# Patient Record
Sex: MALE | Race: WHITE | NOT HISPANIC OR LATINO | URBAN - METROPOLITAN AREA
[De-identification: names, ages, dates, MRNs, and addresses within clinical notes are randomized per-mention and may not be internally consistent; named-entity substitution may affect disease eponyms.]

---

## 2019-08-01 ENCOUNTER — ANESTHESIA (INPATIENT)
Dept: PERIOP | Facility: HOSPITAL | Age: 29
DRG: 912 | End: 2019-08-01
Payer: COMMERCIAL

## 2019-08-01 ENCOUNTER — HOSPITAL ENCOUNTER (INPATIENT)
Facility: HOSPITAL | Age: 29
LOS: 10 days | Discharge: HOME | DRG: 912 | End: 2019-08-11
Attending: EMERGENCY MEDICINE | Admitting: EMERGENCY MEDICINE
Payer: COMMERCIAL

## 2019-08-01 ENCOUNTER — ANESTHESIA EVENT (INPATIENT)
Dept: PERIOP | Facility: HOSPITAL | Age: 29
DRG: 912 | End: 2019-08-01
Payer: COMMERCIAL

## 2019-08-01 ENCOUNTER — APPOINTMENT (EMERGENCY)
Dept: RADIOLOGY | Facility: HOSPITAL | Age: 29
DRG: 912 | End: 2019-08-01
Payer: COMMERCIAL

## 2019-08-01 ENCOUNTER — APPOINTMENT (INPATIENT)
Dept: RADIOLOGY | Facility: HOSPITAL | Age: 29
DRG: 912 | End: 2019-08-01
Attending: EMERGENCY MEDICINE
Payer: COMMERCIAL

## 2019-08-01 DIAGNOSIS — T07.XXXA MULTIPLE TRAUMA: Primary | ICD-10-CM

## 2019-08-01 DIAGNOSIS — S32.402A LEFT ACETABULAR FRACTURE (HCC): ICD-10-CM

## 2019-08-01 DIAGNOSIS — S32.302A: ICD-10-CM

## 2019-08-01 PROBLEM — J98.4 PULMONARY INSUFFICIENCY: Status: ACTIVE | Noted: 2019-08-01

## 2019-08-01 PROBLEM — S81.812A LACERATION OF LEFT LEG: Status: ACTIVE | Noted: 2019-08-01

## 2019-08-01 PROBLEM — S71.112A LACERATION OF LEFT THIGH: Status: ACTIVE | Noted: 2019-08-01

## 2019-08-01 PROBLEM — S36.039A SPLENIC LACERATION: Status: ACTIVE | Noted: 2019-08-01

## 2019-08-01 PROBLEM — S27.329A PULMONARY CONTUSION: Status: ACTIVE | Noted: 2019-08-01

## 2019-08-01 PROBLEM — S22.32XA FRACTURE OF RIB OF LEFT SIDE: Status: ACTIVE | Noted: 2019-08-01

## 2019-08-01 LAB
ABO GROUP BLD: NORMAL
ALBUMIN SERPL BCP-MCNC: 4.1 G/DL (ref 3.5–5)
ALP SERPL-CCNC: 27 U/L (ref 46–116)
ALT SERPL W P-5'-P-CCNC: 73 U/L (ref 12–78)
ANION GAP SERPL CALCULATED.3IONS-SCNC: 8 MMOL/L (ref 4–13)
APTT PPP: 30 SECONDS (ref 23–37)
APTT PPP: 32 SECONDS (ref 23–37)
AST SERPL W P-5'-P-CCNC: 62 U/L (ref 5–45)
BASE EXCESS BLDA CALC-SCNC: -1.6 MMOL/L
BASE EXCESS BLDA CALC-SCNC: -1.6 MMOL/L
BASE EXCESS BLDA CALC-SCNC: 1 MMOL/L (ref -2–3)
BASOPHILS # BLD AUTO: 0.06 THOUSANDS/ΜL (ref 0–0.1)
BASOPHILS NFR BLD AUTO: 1 % (ref 0–1)
BILIRUB SERPL-MCNC: 0.29 MG/DL (ref 0.2–1)
BLD GP AB SCN SERPL QL: NEGATIVE
BUN SERPL-MCNC: 9 MG/DL (ref 5–25)
CA-I BLD-SCNC: 1.15 MMOL/L (ref 1.12–1.32)
CALCIUM SERPL-MCNC: 8.9 MG/DL (ref 8.3–10.1)
CHLORIDE SERPL-SCNC: 103 MMOL/L (ref 100–108)
CO2 SERPL-SCNC: 27 MMOL/L (ref 21–32)
CREAT SERPL-MCNC: 1.23 MG/DL (ref 0.6–1.3)
EOSINOPHIL # BLD AUTO: 0.09 THOUSAND/ΜL (ref 0–0.61)
EOSINOPHIL NFR BLD AUTO: 1 % (ref 0–6)
ERYTHROCYTE [DISTWIDTH] IN BLOOD BY AUTOMATED COUNT: 12.7 % (ref 11.6–15.1)
ERYTHROCYTE [DISTWIDTH] IN BLOOD BY AUTOMATED COUNT: 12.8 % (ref 11.6–15.1)
FIBRINOGEN PPP-MCNC: 170 MG/DL (ref 227–495)
GFR SERPL CREATININE-BSD FRML MDRD: 78 ML/MIN/1.73SQ M
GLUCOSE SERPL-MCNC: 167 MG/DL (ref 65–140)
GLUCOSE SERPL-MCNC: 172 MG/DL (ref 65–140)
HCO3 BLDA-SCNC: 25.2 MMOL/L (ref 22–28)
HCO3 BLDA-SCNC: 25.4 MMOL/L (ref 22–28)
HCO3 BLDA-SCNC: 26.8 MMOL/L (ref 24–30)
HCT VFR BLD AUTO: 29.7 % (ref 36.5–49.3)
HCT VFR BLD AUTO: 29.9 % (ref 36.5–49.3)
HCT VFR BLD AUTO: 30.7 % (ref 36.5–49.3)
HCT VFR BLD AUTO: 40.3 % (ref 36.5–49.3)
HCT VFR BLD CALC: 32 % (ref 36.5–49.3)
HGB BLD-MCNC: 10.2 G/DL (ref 12–17)
HGB BLD-MCNC: 10.3 G/DL (ref 12–17)
HGB BLD-MCNC: 13.2 G/DL (ref 12–17)
HGB BLD-MCNC: 9.8 G/DL (ref 12–17)
HGB BLDA-MCNC: 10.9 G/DL (ref 12–17)
IMM GRANULOCYTES # BLD AUTO: 0.18 THOUSAND/UL (ref 0–0.2)
IMM GRANULOCYTES NFR BLD AUTO: 2 % (ref 0–2)
INR PPP: 1.19 (ref 0.84–1.19)
INR PPP: 1.46 (ref 0.84–1.19)
LACTATE SERPL-SCNC: 0.6 MMOL/L (ref 0.5–2)
LYMPHOCYTES # BLD AUTO: 3.05 THOUSANDS/ΜL (ref 0.6–4.47)
LYMPHOCYTES NFR BLD AUTO: 36 % (ref 14–44)
MCH RBC QN AUTO: 29.9 PG (ref 26.8–34.3)
MCH RBC QN AUTO: 30 PG (ref 26.8–34.3)
MCHC RBC AUTO-ENTMCNC: 32.8 G/DL (ref 31.4–37.4)
MCHC RBC AUTO-ENTMCNC: 33 G/DL (ref 31.4–37.4)
MCV RBC AUTO: 91 FL (ref 82–98)
MCV RBC AUTO: 91 FL (ref 82–98)
MONOCYTES # BLD AUTO: 0.67 THOUSAND/ΜL (ref 0.17–1.22)
MONOCYTES NFR BLD AUTO: 8 % (ref 4–12)
NASAL CANNULA: 3
NEUTROPHILS # BLD AUTO: 4.49 THOUSANDS/ΜL (ref 1.85–7.62)
NEUTS SEG NFR BLD AUTO: 52 % (ref 43–75)
NRBC BLD AUTO-RTO: 0 /100 WBCS
O2 CT BLDA-SCNC: 14.6 ML/DL (ref 16–23)
O2 CT BLDA-SCNC: 14.6 ML/DL (ref 16–23)
OXYHGB MFR BLDA: 94.3 % (ref 94–97)
OXYHGB MFR BLDA: 95.2 % (ref 94–97)
PCO2 BLD: 28 MMOL/L (ref 21–32)
PCO2 BLD: 47.9 MM HG (ref 42–50)
PCO2 BLDA: 52 MM HG (ref 36–44)
PCO2 BLDA: 54 MM HG (ref 36–44)
PH BLD: 7.36 [PH] (ref 7.3–7.4)
PH BLDA: 7.29 [PH] (ref 7.35–7.45)
PH BLDA: 7.3 [PH] (ref 7.35–7.45)
PLATELET # BLD AUTO: 135 THOUSANDS/UL (ref 149–390)
PLATELET # BLD AUTO: 80 THOUSANDS/UL (ref 149–390)
PMV BLD AUTO: 10 FL (ref 8.9–12.7)
PMV BLD AUTO: 10.2 FL (ref 8.9–12.7)
PO2 BLD: 59 MM HG (ref 35–45)
PO2 BLDA: 102.3 MM HG (ref 75–129)
PO2 BLDA: 111.2 MM HG (ref 75–129)
POTASSIUM BLD-SCNC: 3.4 MMOL/L (ref 3.5–5.3)
POTASSIUM SERPL-SCNC: 3.2 MMOL/L (ref 3.5–5.3)
PROT SERPL-MCNC: 7.2 G/DL (ref 6.4–8.2)
PROTHROMBIN TIME: 14.7 SECONDS (ref 11.6–14.5)
PROTHROMBIN TIME: 17.3 SECONDS (ref 11.6–14.5)
RBC # BLD AUTO: 3.27 MILLION/UL (ref 3.88–5.62)
RBC # BLD AUTO: 4.41 MILLION/UL (ref 3.88–5.62)
RH BLD: POSITIVE
SAO2 % BLD FROM PO2: 89 % (ref 95–98)
SODIUM BLD-SCNC: 139 MMOL/L (ref 136–145)
SODIUM SERPL-SCNC: 138 MMOL/L (ref 136–145)
SPECIMEN EXPIRATION DATE: NORMAL
SPECIMEN SOURCE: ABNORMAL
WBC # BLD AUTO: 10.7 THOUSAND/UL (ref 4.31–10.16)
WBC # BLD AUTO: 8.54 THOUSAND/UL (ref 4.31–10.16)

## 2019-08-01 PROCEDURE — 86900 BLOOD TYPING SEROLOGIC ABO: CPT | Performed by: EMERGENCY MEDICINE

## 2019-08-01 PROCEDURE — 85014 HEMATOCRIT: CPT | Performed by: EMERGENCY MEDICINE

## 2019-08-01 PROCEDURE — 84132 ASSAY OF SERUM POTASSIUM: CPT

## 2019-08-01 PROCEDURE — 74177 CT ABD & PELVIS W/CONTRAST: CPT

## 2019-08-01 PROCEDURE — C1766 INTRO/SHEATH,STRBLE,NON-PEEL: HCPCS

## 2019-08-01 PROCEDURE — 37244 VASC EMBOLIZE/OCCLUDE BLEED: CPT | Performed by: STUDENT IN AN ORGANIZED HEALTH CARE EDUCATION/TRAINING PROGRAM

## 2019-08-01 PROCEDURE — 99221 1ST HOSP IP/OBS SF/LOW 40: CPT | Performed by: ORTHOPAEDIC SURGERY

## 2019-08-01 PROCEDURE — 04V43DZ RESTRICTION OF SPLENIC ARTERY WITH INTRALUMINAL DEVICE, PERCUTANEOUS APPROACH: ICD-10-PCS | Performed by: STUDENT IN AN ORGANIZED HEALTH CARE EDUCATION/TRAINING PROGRAM

## 2019-08-01 PROCEDURE — 86901 BLOOD TYPING SEROLOGIC RH(D): CPT | Performed by: EMERGENCY MEDICINE

## 2019-08-01 PROCEDURE — 86920 COMPATIBILITY TEST SPIN: CPT

## 2019-08-01 PROCEDURE — 85027 COMPLETE CBC AUTOMATED: CPT | Performed by: PHYSICIAN ASSISTANT

## 2019-08-01 PROCEDURE — 99153 MOD SED SAME PHYS/QHP EA: CPT

## 2019-08-01 PROCEDURE — 99285 EMERGENCY DEPT VISIT HI MDM: CPT

## 2019-08-01 PROCEDURE — C1769 GUIDE WIRE: HCPCS

## 2019-08-01 PROCEDURE — NC001 PR NO CHARGE: Performed by: STUDENT IN AN ORGANIZED HEALTH CARE EDUCATION/TRAINING PROGRAM

## 2019-08-01 PROCEDURE — 2W1PX6Z COMPRESSION OF LEFT UPPER LEG USING PRESSURE DRESSING: ICD-10-PCS | Performed by: SURGERY

## 2019-08-01 PROCEDURE — 85018 HEMOGLOBIN: CPT | Performed by: EMERGENCY MEDICINE

## 2019-08-01 PROCEDURE — 75710 ARTERY X-RAYS ARM/LEG: CPT | Performed by: STUDENT IN AN ORGANIZED HEALTH CARE EDUCATION/TRAINING PROGRAM

## 2019-08-01 PROCEDURE — 84295 ASSAY OF SERUM SODIUM: CPT

## 2019-08-01 PROCEDURE — 85014 HEMATOCRIT: CPT | Performed by: SURGERY

## 2019-08-01 PROCEDURE — 97606 NEG PRS WND THER DME>50 SQCM: CPT | Performed by: SURGERY

## 2019-08-01 PROCEDURE — 85018 HEMOGLOBIN: CPT | Performed by: SURGERY

## 2019-08-01 PROCEDURE — B413ZZZ FLUOROSCOPY OF SPLENIC ARTERIES: ICD-10-PCS | Performed by: STUDENT IN AN ORGANIZED HEALTH CARE EDUCATION/TRAINING PROGRAM

## 2019-08-01 PROCEDURE — 90471 IMMUNIZATION ADMIN: CPT

## 2019-08-01 PROCEDURE — 90715 TDAP VACCINE 7 YRS/> IM: CPT | Performed by: PHYSICIAN ASSISTANT

## 2019-08-01 PROCEDURE — 12004 RPR S/N/AX/GEN/TRK7.6-12.5CM: CPT | Performed by: SURGERY

## 2019-08-01 PROCEDURE — 36247 INS CATH ABD/L-EXT ART 3RD: CPT | Performed by: STUDENT IN AN ORGANIZED HEALTH CARE EDUCATION/TRAINING PROGRAM

## 2019-08-01 PROCEDURE — P9016 RBC LEUKOCYTES REDUCED: HCPCS

## 2019-08-01 PROCEDURE — 37243 VASC EMBOLIZE/OCCLUDE ORGAN: CPT

## 2019-08-01 PROCEDURE — 36248 INS CATH ABD/L-EXT ART ADDL: CPT | Performed by: STUDENT IN AN ORGANIZED HEALTH CARE EDUCATION/TRAINING PROGRAM

## 2019-08-01 PROCEDURE — 0JDM0ZZ EXTRACTION OF LEFT UPPER LEG SUBCUTANEOUS TISSUE AND FASCIA, OPEN APPROACH: ICD-10-PCS | Performed by: SURGERY

## 2019-08-01 PROCEDURE — 3E0U3KZ INTRODUCTION OF OTHER DIAGNOSTIC SUBSTANCE INTO JOINTS, PERCUTANEOUS APPROACH: ICD-10-PCS | Performed by: ORTHOPAEDIC SURGERY

## 2019-08-01 PROCEDURE — 85610 PROTHROMBIN TIME: CPT | Performed by: EMERGENCY MEDICINE

## 2019-08-01 PROCEDURE — 86850 RBC ANTIBODY SCREEN: CPT | Performed by: EMERGENCY MEDICINE

## 2019-08-01 PROCEDURE — 99291 CRITICAL CARE FIRST HOUR: CPT | Performed by: EMERGENCY MEDICINE

## 2019-08-01 PROCEDURE — NC001 PR NO CHARGE: Performed by: SURGERY

## 2019-08-01 PROCEDURE — 75635 CT ANGIO ABDOMINAL ARTERIES: CPT

## 2019-08-01 PROCEDURE — 83605 ASSAY OF LACTIC ACID: CPT | Performed by: PHYSICIAN ASSISTANT

## 2019-08-01 PROCEDURE — 80053 COMPREHEN METABOLIC PANEL: CPT | Performed by: EMERGENCY MEDICINE

## 2019-08-01 PROCEDURE — 0JQM0ZZ REPAIR LEFT UPPER LEG SUBCUTANEOUS TISSUE AND FASCIA, OPEN APPROACH: ICD-10-PCS | Performed by: SURGERY

## 2019-08-01 PROCEDURE — 85730 THROMBOPLASTIN TIME PARTIAL: CPT | Performed by: STUDENT IN AN ORGANIZED HEALTH CARE EDUCATION/TRAINING PROGRAM

## 2019-08-01 PROCEDURE — 99152 MOD SED SAME PHYS/QHP 5/>YRS: CPT | Performed by: STUDENT IN AN ORGANIZED HEALTH CARE EDUCATION/TRAINING PROGRAM

## 2019-08-01 PROCEDURE — C1887 CATHETER, GUIDING: HCPCS

## 2019-08-01 PROCEDURE — 85025 COMPLETE CBC W/AUTO DIFF WBC: CPT | Performed by: EMERGENCY MEDICINE

## 2019-08-01 PROCEDURE — B41GZZZ FLUOROSCOPY OF LEFT LOWER EXTREMITY ARTERIES: ICD-10-PCS | Performed by: STUDENT IN AN ORGANIZED HEALTH CARE EDUCATION/TRAINING PROGRAM

## 2019-08-01 PROCEDURE — 82805 BLOOD GASES W/O2 SATURATION: CPT | Performed by: PHYSICIAN ASSISTANT

## 2019-08-01 PROCEDURE — 82803 BLOOD GASES ANY COMBINATION: CPT

## 2019-08-01 PROCEDURE — C1894 INTRO/SHEATH, NON-LASER: HCPCS

## 2019-08-01 PROCEDURE — 85610 PROTHROMBIN TIME: CPT | Performed by: STUDENT IN AN ORGANIZED HEALTH CARE EDUCATION/TRAINING PROGRAM

## 2019-08-01 PROCEDURE — 96374 THER/PROPH/DIAG INJ IV PUSH: CPT

## 2019-08-01 PROCEDURE — 70450 CT HEAD/BRAIN W/O DYE: CPT

## 2019-08-01 PROCEDURE — 82947 ASSAY GLUCOSE BLOOD QUANT: CPT

## 2019-08-01 PROCEDURE — 71260 CT THORAX DX C+: CPT

## 2019-08-01 PROCEDURE — 27508 TREATMENT OF THIGH FRACTURE: CPT | Performed by: ORTHOPAEDIC SURGERY

## 2019-08-01 PROCEDURE — 85014 HEMATOCRIT: CPT

## 2019-08-01 PROCEDURE — 20610 DRAIN/INJ JOINT/BURSA W/O US: CPT | Performed by: ORTHOPAEDIC SURGERY

## 2019-08-01 PROCEDURE — 36430 TRANSFUSION BLD/BLD COMPNT: CPT

## 2019-08-01 PROCEDURE — 85730 THROMBOPLASTIN TIME PARTIAL: CPT | Performed by: EMERGENCY MEDICINE

## 2019-08-01 PROCEDURE — 72125 CT NECK SPINE W/O DYE: CPT

## 2019-08-01 PROCEDURE — 99152 MOD SED SAME PHYS/QHP 5/>YRS: CPT

## 2019-08-01 PROCEDURE — 30233N1 TRANSFUSION OF NONAUTOLOGOUS RED BLOOD CELLS INTO PERIPHERAL VEIN, PERCUTANEOUS APPROACH: ICD-10-PCS | Performed by: EMERGENCY MEDICINE

## 2019-08-01 PROCEDURE — 85384 FIBRINOGEN ACTIVITY: CPT | Performed by: STUDENT IN AN ORGANIZED HEALTH CARE EDUCATION/TRAINING PROGRAM

## 2019-08-01 PROCEDURE — 99292 CRITICAL CARE ADDL 30 MIN: CPT | Performed by: SURGERY

## 2019-08-01 PROCEDURE — C1760 CLOSURE DEV, VASC: HCPCS

## 2019-08-01 PROCEDURE — 82330 ASSAY OF CALCIUM: CPT

## 2019-08-01 PROCEDURE — 75898 FOLLOW-UP ANGIOGRAPHY: CPT

## 2019-08-01 PROCEDURE — 36415 COLL VENOUS BLD VENIPUNCTURE: CPT | Performed by: EMERGENCY MEDICINE

## 2019-08-01 PROCEDURE — 82805 BLOOD GASES W/O2 SATURATION: CPT | Performed by: STUDENT IN AN ORGANIZED HEALTH CARE EDUCATION/TRAINING PROGRAM

## 2019-08-01 PROCEDURE — 75726 ARTERY X-RAYS ABDOMEN: CPT

## 2019-08-01 PROCEDURE — NC001 PR NO CHARGE: Performed by: EMERGENCY MEDICINE

## 2019-08-01 RX ORDER — SODIUM CHLORIDE 9 MG/ML
INJECTION, SOLUTION INTRAVENOUS AS NEEDED
Status: DISCONTINUED | OUTPATIENT
Start: 2019-08-01 | End: 2019-08-01 | Stop reason: HOSPADM

## 2019-08-01 RX ORDER — NEOSTIGMINE METHYLSULFATE 1 MG/ML
INJECTION INTRAVENOUS AS NEEDED
Status: DISCONTINUED | OUTPATIENT
Start: 2019-08-01 | End: 2019-08-01 | Stop reason: SURG

## 2019-08-01 RX ORDER — CEFAZOLIN SODIUM 2 G/50ML
2000 SOLUTION INTRAVENOUS ONCE
Status: DISCONTINUED | OUTPATIENT
Start: 2019-08-01 | End: 2019-08-01

## 2019-08-01 RX ORDER — ONDANSETRON 2 MG/ML
INJECTION INTRAMUSCULAR; INTRAVENOUS AS NEEDED
Status: DISCONTINUED | OUTPATIENT
Start: 2019-08-01 | End: 2019-08-01 | Stop reason: SURG

## 2019-08-01 RX ORDER — METOCLOPRAMIDE HYDROCHLORIDE 5 MG/ML
10 INJECTION INTRAMUSCULAR; INTRAVENOUS ONCE AS NEEDED
Status: DISCONTINUED | OUTPATIENT
Start: 2019-08-01 | End: 2019-08-01 | Stop reason: HOSPADM

## 2019-08-01 RX ORDER — METHOCARBAMOL 750 MG/1
750 TABLET, FILM COATED ORAL EVERY 6 HOURS SCHEDULED
Status: DISCONTINUED | OUTPATIENT
Start: 2019-08-01 | End: 2019-08-11 | Stop reason: HOSPADM

## 2019-08-01 RX ORDER — OXYCODONE HYDROCHLORIDE 10 MG/1
10 TABLET ORAL EVERY 4 HOURS PRN
Status: DISCONTINUED | OUTPATIENT
Start: 2019-08-01 | End: 2019-08-02

## 2019-08-01 RX ORDER — DOCUSATE SODIUM 100 MG/1
100 CAPSULE, LIQUID FILLED ORAL 2 TIMES DAILY
Status: DISCONTINUED | OUTPATIENT
Start: 2019-08-01 | End: 2019-08-11 | Stop reason: HOSPADM

## 2019-08-01 RX ORDER — POTASSIUM CHLORIDE 14.9 MG/ML
20 INJECTION INTRAVENOUS ONCE
Status: COMPLETED | OUTPATIENT
Start: 2019-08-01 | End: 2019-08-01

## 2019-08-01 RX ORDER — HYDROMORPHONE HCL/PF 1 MG/ML
0.5 SYRINGE (ML) INJECTION
Status: DISCONTINUED | OUTPATIENT
Start: 2019-08-01 | End: 2019-08-01 | Stop reason: HOSPADM

## 2019-08-01 RX ORDER — KETAMINE HYDROCHLORIDE 50 MG/ML
INJECTION, SOLUTION, CONCENTRATE INTRAMUSCULAR; INTRAVENOUS AS NEEDED
Status: DISCONTINUED | OUTPATIENT
Start: 2019-08-01 | End: 2019-08-01 | Stop reason: SURG

## 2019-08-01 RX ORDER — ONDANSETRON 2 MG/ML
4 INJECTION INTRAMUSCULAR; INTRAVENOUS EVERY 4 HOURS PRN
Status: DISCONTINUED | OUTPATIENT
Start: 2019-08-01 | End: 2019-08-11 | Stop reason: HOSPADM

## 2019-08-01 RX ORDER — SUCCINYLCHOLINE/SOD CL,ISO/PF 100 MG/5ML
SYRINGE (ML) INTRAVENOUS AS NEEDED
Status: DISCONTINUED | OUTPATIENT
Start: 2019-08-01 | End: 2019-08-01 | Stop reason: SURG

## 2019-08-01 RX ORDER — MIDAZOLAM HYDROCHLORIDE 1 MG/ML
INJECTION INTRAMUSCULAR; INTRAVENOUS CODE/TRAUMA/SEDATION MEDICATION
Status: COMPLETED | OUTPATIENT
Start: 2019-08-01 | End: 2019-08-01

## 2019-08-01 RX ORDER — FENTANYL CITRATE/PF 50 MCG/ML
50 SYRINGE (ML) INJECTION
Status: DISCONTINUED | OUTPATIENT
Start: 2019-08-01 | End: 2019-08-01 | Stop reason: HOSPADM

## 2019-08-01 RX ORDER — SENNOSIDES 8.6 MG
2 TABLET ORAL
Status: DISCONTINUED | OUTPATIENT
Start: 2019-08-01 | End: 2019-08-11 | Stop reason: HOSPADM

## 2019-08-01 RX ORDER — CEFAZOLIN SODIUM 2 G/50ML
2000 SOLUTION INTRAVENOUS
Status: COMPLETED | OUTPATIENT
Start: 2019-08-02 | End: 2019-08-01

## 2019-08-01 RX ORDER — FENTANYL CITRATE 50 UG/ML
INJECTION, SOLUTION INTRAMUSCULAR; INTRAVENOUS CODE/TRAUMA/SEDATION MEDICATION
Status: COMPLETED | OUTPATIENT
Start: 2019-08-01 | End: 2019-08-01

## 2019-08-01 RX ORDER — SODIUM CHLORIDE 9 MG/ML
INJECTION, SOLUTION INTRAVENOUS CONTINUOUS PRN
Status: DISCONTINUED | OUTPATIENT
Start: 2019-08-01 | End: 2019-08-01 | Stop reason: SURG

## 2019-08-01 RX ORDER — CHLORHEXIDINE GLUCONATE 0.12 MG/ML
15 RINSE ORAL EVERY 12 HOURS SCHEDULED
Status: DISCONTINUED | OUTPATIENT
Start: 2019-08-01 | End: 2019-08-02

## 2019-08-01 RX ORDER — ACETAMINOPHEN 325 MG/1
650 TABLET ORAL EVERY 6 HOURS SCHEDULED
Status: DISCONTINUED | OUTPATIENT
Start: 2019-08-01 | End: 2019-08-02

## 2019-08-01 RX ORDER — GLYCOPYRROLATE 0.2 MG/ML
INJECTION INTRAMUSCULAR; INTRAVENOUS AS NEEDED
Status: DISCONTINUED | OUTPATIENT
Start: 2019-08-01 | End: 2019-08-01 | Stop reason: SURG

## 2019-08-01 RX ORDER — ROCURONIUM BROMIDE 10 MG/ML
INJECTION, SOLUTION INTRAVENOUS AS NEEDED
Status: DISCONTINUED | OUTPATIENT
Start: 2019-08-01 | End: 2019-08-01 | Stop reason: SURG

## 2019-08-01 RX ORDER — ONDANSETRON 2 MG/ML
4 INJECTION INTRAMUSCULAR; INTRAVENOUS ONCE
Status: COMPLETED | OUTPATIENT
Start: 2019-08-01 | End: 2019-08-01

## 2019-08-01 RX ORDER — MAGNESIUM HYDROXIDE 1200 MG/15ML
LIQUID ORAL AS NEEDED
Status: DISCONTINUED | OUTPATIENT
Start: 2019-08-01 | End: 2019-08-01 | Stop reason: HOSPADM

## 2019-08-01 RX ORDER — LIDOCAINE 50 MG/G
1 PATCH TOPICAL DAILY
Status: DISCONTINUED | OUTPATIENT
Start: 2019-08-02 | End: 2019-08-11 | Stop reason: HOSPADM

## 2019-08-01 RX ORDER — PANTOPRAZOLE SODIUM 40 MG/1
40 INJECTION, POWDER, FOR SOLUTION INTRAVENOUS
Status: DISCONTINUED | OUTPATIENT
Start: 2019-08-02 | End: 2019-08-02

## 2019-08-01 RX ORDER — PROPOFOL 10 MG/ML
INJECTION, EMULSION INTRAVENOUS AS NEEDED
Status: DISCONTINUED | OUTPATIENT
Start: 2019-08-01 | End: 2019-08-01 | Stop reason: SURG

## 2019-08-01 RX ORDER — OXYCODONE HYDROCHLORIDE 5 MG/1
5 TABLET ORAL EVERY 4 HOURS PRN
Status: DISCONTINUED | OUTPATIENT
Start: 2019-08-01 | End: 2019-08-02

## 2019-08-01 RX ORDER — HYDROMORPHONE HCL/PF 1 MG/ML
0.5 SYRINGE (ML) INJECTION
Status: DISCONTINUED | OUTPATIENT
Start: 2019-08-01 | End: 2019-08-02

## 2019-08-01 RX ORDER — CEFAZOLIN SODIUM 1 G/3ML
INJECTION, POWDER, FOR SOLUTION INTRAMUSCULAR; INTRAVENOUS CODE/TRAUMA/SEDATION MEDICATION
Status: COMPLETED | OUTPATIENT
Start: 2019-08-01 | End: 2019-08-01

## 2019-08-01 RX ORDER — SODIUM CHLORIDE, SODIUM GLUCONATE, SODIUM ACETATE, POTASSIUM CHLORIDE, MAGNESIUM CHLORIDE, SODIUM PHOSPHATE, DIBASIC, AND POTASSIUM PHOSPHATE .53; .5; .37; .037; .03; .012; .00082 G/100ML; G/100ML; G/100ML; G/100ML; G/100ML; G/100ML; G/100ML
100 INJECTION, SOLUTION INTRAVENOUS CONTINUOUS
Status: DISCONTINUED | OUTPATIENT
Start: 2019-08-01 | End: 2019-08-02

## 2019-08-01 RX ADMIN — FENTANYL CITRATE 50 MCG: 50 INJECTION, SOLUTION INTRAMUSCULAR; INTRAVENOUS at 14:06

## 2019-08-01 RX ADMIN — FENTANYL CITRATE 25 MCG: 50 INJECTION, SOLUTION INTRAMUSCULAR; INTRAVENOUS at 17:21

## 2019-08-01 RX ADMIN — FENTANYL CITRATE 50 MCG: 50 INJECTION, SOLUTION INTRAMUSCULAR; INTRAVENOUS at 17:51

## 2019-08-01 RX ADMIN — MIDAZOLAM 1 MG: 1 INJECTION INTRAMUSCULAR; INTRAVENOUS at 18:18

## 2019-08-01 RX ADMIN — SODIUM CHLORIDE: 0.9 INJECTION, SOLUTION INTRAVENOUS at 20:29

## 2019-08-01 RX ADMIN — FENTANYL CITRATE 50 MCG: 50 INJECTION, SOLUTION INTRAMUSCULAR; INTRAVENOUS at 15:57

## 2019-08-01 RX ADMIN — KETAMINE HYDROCHLORIDE 25 MG: 50 INJECTION, SOLUTION INTRAMUSCULAR; INTRAVENOUS at 20:41

## 2019-08-01 RX ADMIN — MIDAZOLAM 1 MG: 1 INJECTION INTRAMUSCULAR; INTRAVENOUS at 16:55

## 2019-08-01 RX ADMIN — FENTANYL CITRATE 50 MCG: 50 INJECTION, SOLUTION INTRAMUSCULAR; INTRAVENOUS at 18:27

## 2019-08-01 RX ADMIN — FENTANYL CITRATE 50 MCG: 50 INJECTION, SOLUTION INTRAMUSCULAR; INTRAVENOUS at 18:12

## 2019-08-01 RX ADMIN — FENTANYL CITRATE 25 MCG: 50 INJECTION, SOLUTION INTRAMUSCULAR; INTRAVENOUS at 17:28

## 2019-08-01 RX ADMIN — ROCURONIUM BROMIDE 20 MG: 10 INJECTION INTRAVENOUS at 21:10

## 2019-08-01 RX ADMIN — FENTANYL CITRATE 50 MCG: 50 INJECTION, SOLUTION INTRAMUSCULAR; INTRAVENOUS at 15:32

## 2019-08-01 RX ADMIN — POTASSIUM CHLORIDE 20 MEQ: 200 INJECTION, SOLUTION INTRAVENOUS at 15:35

## 2019-08-01 RX ADMIN — PROPOFOL 200 MG: 10 INJECTION, EMULSION INTRAVENOUS at 20:35

## 2019-08-01 RX ADMIN — MIDAZOLAM 1 MG: 1 INJECTION INTRAMUSCULAR; INTRAVENOUS at 15:32

## 2019-08-01 RX ADMIN — GLYCOPYRROLATE 0.7 MG: 0.2 INJECTION, SOLUTION INTRAMUSCULAR; INTRAVENOUS at 22:01

## 2019-08-01 RX ADMIN — FENTANYL CITRATE 25 MCG: 50 INJECTION, SOLUTION INTRAMUSCULAR; INTRAVENOUS at 17:01

## 2019-08-01 RX ADMIN — FENTANYL CITRATE 50 MCG: 50 INJECTION, SOLUTION INTRAMUSCULAR; INTRAVENOUS at 17:56

## 2019-08-01 RX ADMIN — MIDAZOLAM 1 MG: 1 INJECTION INTRAMUSCULAR; INTRAVENOUS at 15:26

## 2019-08-01 RX ADMIN — SODIUM CHLORIDE, SODIUM GLUCONATE, SODIUM ACETATE, POTASSIUM CHLORIDE, MAGNESIUM CHLORIDE, SODIUM PHOSPHATE, DIBASIC, AND POTASSIUM PHOSPHATE 125 ML/HR: .53; .5; .37; .037; .03; .012; .00082 INJECTION, SOLUTION INTRAVENOUS at 22:38

## 2019-08-01 RX ADMIN — MIDAZOLAM 0.5 MG: 1 INJECTION INTRAMUSCULAR; INTRAVENOUS at 15:56

## 2019-08-01 RX ADMIN — FENTANYL CITRATE 50 MCG: 50 INJECTION, SOLUTION INTRAMUSCULAR; INTRAVENOUS at 17:36

## 2019-08-01 RX ADMIN — KETAMINE HYDROCHLORIDE 25 MG: 50 INJECTION, SOLUTION INTRAMUSCULAR; INTRAVENOUS at 20:32

## 2019-08-01 RX ADMIN — FENTANYL CITRATE 25 MCG: 50 INJECTION, SOLUTION INTRAMUSCULAR; INTRAVENOUS at 16:13

## 2019-08-01 RX ADMIN — TETANUS TOXOID, REDUCED DIPHTHERIA TOXOID AND ACELLULAR PERTUSSIS VACCINE, ADSORBED 0.5 ML: 5; 2.5; 8; 8; 2.5 SUSPENSION INTRAMUSCULAR at 14:38

## 2019-08-01 RX ADMIN — ONDANSETRON 4 MG: 2 INJECTION INTRAMUSCULAR; INTRAVENOUS at 23:53

## 2019-08-01 RX ADMIN — Medication 100 MG: at 20:35

## 2019-08-01 RX ADMIN — MIDAZOLAM 0.5 MG: 1 INJECTION INTRAMUSCULAR; INTRAVENOUS at 16:10

## 2019-08-01 RX ADMIN — MIDAZOLAM 0.5 MG: 1 INJECTION INTRAMUSCULAR; INTRAVENOUS at 17:28

## 2019-08-01 RX ADMIN — PHENYLEPHRINE HYDROCHLORIDE 100 MCG: 10 INJECTION INTRAVENOUS at 21:25

## 2019-08-01 RX ADMIN — MIDAZOLAM 1 MG: 1 INJECTION INTRAMUSCULAR; INTRAVENOUS at 17:58

## 2019-08-01 RX ADMIN — MIDAZOLAM 1.5 MG: 1 INJECTION INTRAMUSCULAR; INTRAVENOUS at 17:39

## 2019-08-01 RX ADMIN — MIDAZOLAM 0.5 MG: 1 INJECTION INTRAMUSCULAR; INTRAVENOUS at 17:10

## 2019-08-01 RX ADMIN — FENTANYL CITRATE 25 MCG: 50 INJECTION, SOLUTION INTRAMUSCULAR; INTRAVENOUS at 18:41

## 2019-08-01 RX ADMIN — MIDAZOLAM 0.5 MG: 1 INJECTION INTRAMUSCULAR; INTRAVENOUS at 16:32

## 2019-08-01 RX ADMIN — MIDAZOLAM 1 MG: 1 INJECTION INTRAMUSCULAR; INTRAVENOUS at 18:41

## 2019-08-01 RX ADMIN — CEFAZOLIN SODIUM 2000 MG: 2 SOLUTION INTRAVENOUS at 20:35

## 2019-08-01 RX ADMIN — FENTANYL CITRATE 50 MCG: 50 INJECTION, SOLUTION INTRAMUSCULAR; INTRAVENOUS at 17:09

## 2019-08-01 RX ADMIN — CEFAZOLIN 1000 MG: 1 INJECTION, POWDER, FOR SOLUTION INTRAVENOUS at 17:38

## 2019-08-01 RX ADMIN — FENTANYL CITRATE 50 MCG: 50 INJECTION, SOLUTION INTRAMUSCULAR; INTRAVENOUS at 14:03

## 2019-08-01 RX ADMIN — MIDAZOLAM 0.5 MG: 1 INJECTION INTRAMUSCULAR; INTRAVENOUS at 17:21

## 2019-08-01 RX ADMIN — IODIXANOL 125 ML: 320 INJECTION, SOLUTION INTRAVASCULAR at 19:17

## 2019-08-01 RX ADMIN — MIDAZOLAM 0.5 MG: 1 INJECTION INTRAMUSCULAR; INTRAVENOUS at 16:37

## 2019-08-01 RX ADMIN — NEOSTIGMINE METHYLSULFATE 4 MG: 1 INJECTION, SOLUTION INTRAVENOUS at 22:01

## 2019-08-01 RX ADMIN — FENTANYL CITRATE 50 MCG: 50 INJECTION, SOLUTION INTRAMUSCULAR; INTRAVENOUS at 14:50

## 2019-08-01 RX ADMIN — METHOCARBAMOL 750 MG: 750 TABLET, FILM COATED ORAL at 23:51

## 2019-08-01 RX ADMIN — ACETAMINOPHEN 650 MG: 325 TABLET ORAL at 23:51

## 2019-08-01 RX ADMIN — FENTANYL CITRATE 50 MCG: 50 INJECTION, SOLUTION INTRAMUSCULAR; INTRAVENOUS at 15:26

## 2019-08-01 RX ADMIN — FENTANYL CITRATE 25 MCG: 50 INJECTION, SOLUTION INTRAMUSCULAR; INTRAVENOUS at 16:30

## 2019-08-01 RX ADMIN — FENTANYL CITRATE 50 MCG: 50 INJECTION, SOLUTION INTRAMUSCULAR; INTRAVENOUS at 16:39

## 2019-08-01 RX ADMIN — FENTANYL CITRATE 25 MCG: 50 INJECTION, SOLUTION INTRAMUSCULAR; INTRAVENOUS at 16:55

## 2019-08-01 RX ADMIN — FENTANYL CITRATE 50 MCG: 50 INJECTION, SOLUTION INTRAMUSCULAR; INTRAVENOUS at 14:45

## 2019-08-01 RX ADMIN — ONDANSETRON 4 MG: 2 INJECTION INTRAMUSCULAR; INTRAVENOUS at 22:01

## 2019-08-01 RX ADMIN — IOHEXOL 100 ML: 350 INJECTION, SOLUTION INTRAVENOUS at 14:58

## 2019-08-01 RX ADMIN — KETAMINE HYDROCHLORIDE 20 MG: 50 INJECTION, SOLUTION INTRAMUSCULAR; INTRAVENOUS at 22:12

## 2019-08-01 NOTE — PROGRESS NOTES
Pt seen and examined  Pt w large left thigh laceration and laceration of left popliteal fossa  In IR now for potential embolization for splenic laceration  Consent completed and will go to OR for wound washout after IR  Following       Kenan Leahy, PGY1  General Surgery  8/1/2019  3:07PM
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
## 2019-08-01 NOTE — QUICK NOTE
Cervical spine ct negative, ct head negative  No cervical spine tenderness, no vision changes or paraesthesias  C spine cleared   Cervical collar removed
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
## 2019-08-02 ENCOUNTER — APPOINTMENT (INPATIENT)
Dept: RADIOLOGY | Facility: HOSPITAL | Age: 29
DRG: 912 | End: 2019-08-02
Payer: COMMERCIAL

## 2019-08-02 PROBLEM — S32.309A PELVIS ILIUM FRACTURE (HCC): Status: ACTIVE | Noted: 2019-08-02

## 2019-08-02 PROBLEM — F41.9 ANXIETY: Status: ACTIVE | Noted: 2019-08-02

## 2019-08-02 PROBLEM — S72.413A FEMORAL CONDYLE FRACTURE (HCC): Status: ACTIVE | Noted: 2019-08-02

## 2019-08-02 LAB
ABO GROUP BLD BPU: NORMAL
ANION GAP SERPL CALCULATED.3IONS-SCNC: 7 MMOL/L (ref 4–13)
BASOPHILS # BLD AUTO: 0.02 THOUSANDS/ΜL (ref 0–0.1)
BASOPHILS NFR BLD AUTO: 0 % (ref 0–1)
BPU ID: NORMAL
BUN SERPL-MCNC: 7 MG/DL (ref 5–25)
CALCIUM SERPL-MCNC: 7.8 MG/DL (ref 8.3–10.1)
CHLORIDE SERPL-SCNC: 108 MMOL/L (ref 100–108)
CO2 SERPL-SCNC: 27 MMOL/L (ref 21–32)
CREAT SERPL-MCNC: 0.72 MG/DL (ref 0.6–1.3)
CROSSMATCH: NORMAL
EOSINOPHIL # BLD AUTO: 0 THOUSAND/ΜL (ref 0–0.61)
EOSINOPHIL NFR BLD AUTO: 0 % (ref 0–6)
ERYTHROCYTE [DISTWIDTH] IN BLOOD BY AUTOMATED COUNT: 12.7 % (ref 11.6–15.1)
GFR SERPL CREATININE-BSD FRML MDRD: 124 ML/MIN/1.73SQ M
GLUCOSE SERPL-MCNC: 108 MG/DL (ref 65–140)
HCT VFR BLD AUTO: 27.6 % (ref 36.5–49.3)
HCT VFR BLD AUTO: 28.2 % (ref 36.5–49.3)
HGB BLD-MCNC: 9.5 G/DL (ref 12–17)
HGB BLD-MCNC: 9.6 G/DL (ref 12–17)
IMM GRANULOCYTES # BLD AUTO: 0.04 THOUSAND/UL (ref 0–0.2)
IMM GRANULOCYTES NFR BLD AUTO: 0 % (ref 0–2)
LYMPHOCYTES # BLD AUTO: 0.79 THOUSANDS/ΜL (ref 0.6–4.47)
LYMPHOCYTES NFR BLD AUTO: 8 % (ref 14–44)
MAGNESIUM SERPL-MCNC: 1.9 MG/DL (ref 1.6–2.6)
MCH RBC QN AUTO: 30 PG (ref 26.8–34.3)
MCHC RBC AUTO-ENTMCNC: 33.7 G/DL (ref 31.4–37.4)
MCV RBC AUTO: 89 FL (ref 82–98)
MONOCYTES # BLD AUTO: 1.09 THOUSAND/ΜL (ref 0.17–1.22)
MONOCYTES NFR BLD AUTO: 11 % (ref 4–12)
NEUTROPHILS # BLD AUTO: 8.34 THOUSANDS/ΜL (ref 1.85–7.62)
NEUTS SEG NFR BLD AUTO: 81 % (ref 43–75)
NRBC BLD AUTO-RTO: 0 /100 WBCS
PHOSPHATE SERPL-MCNC: 3.1 MG/DL (ref 2.7–4.5)
PLATELET # BLD AUTO: 70 THOUSANDS/UL (ref 149–390)
PMV BLD AUTO: 10 FL (ref 8.9–12.7)
POTASSIUM SERPL-SCNC: 3.7 MMOL/L (ref 3.5–5.3)
RBC # BLD AUTO: 3.17 MILLION/UL (ref 3.88–5.62)
SODIUM SERPL-SCNC: 142 MMOL/L (ref 136–145)
UNIT DISPENSE STATUS: NORMAL
UNIT PRODUCT CODE: NORMAL
UNIT RH: NORMAL
WBC # BLD AUTO: 10.28 THOUSAND/UL (ref 4.31–10.16)

## 2019-08-02 PROCEDURE — 97167 OT EVAL HIGH COMPLEX 60 MIN: CPT

## 2019-08-02 PROCEDURE — 85014 HEMATOCRIT: CPT | Performed by: PHYSICIAN ASSISTANT

## 2019-08-02 PROCEDURE — 85018 HEMOGLOBIN: CPT | Performed by: PHYSICIAN ASSISTANT

## 2019-08-02 PROCEDURE — 99232 SBSQ HOSP IP/OBS MODERATE 35: CPT | Performed by: PHYSICIAN ASSISTANT

## 2019-08-02 PROCEDURE — 99254 IP/OBS CNSLTJ NEW/EST MOD 60: CPT | Performed by: ANESTHESIOLOGY

## 2019-08-02 PROCEDURE — 99231 SBSQ HOSP IP/OBS SF/LOW 25: CPT | Performed by: STUDENT IN AN ORGANIZED HEALTH CARE EDUCATION/TRAINING PROGRAM

## 2019-08-02 PROCEDURE — 73030 X-RAY EXAM OF SHOULDER: CPT

## 2019-08-02 PROCEDURE — 97163 PT EVAL HIGH COMPLEX 45 MIN: CPT

## 2019-08-02 PROCEDURE — NC001 PR NO CHARGE: Performed by: PHYSICIAN ASSISTANT

## 2019-08-02 PROCEDURE — 83735 ASSAY OF MAGNESIUM: CPT | Performed by: SURGERY

## 2019-08-02 PROCEDURE — G8988 SELF CARE GOAL STATUS: HCPCS

## 2019-08-02 PROCEDURE — G8979 MOBILITY GOAL STATUS: HCPCS

## 2019-08-02 PROCEDURE — 85025 COMPLETE CBC W/AUTO DIFF WBC: CPT | Performed by: SURGERY

## 2019-08-02 PROCEDURE — NC001 PR NO CHARGE: Performed by: SURGERY

## 2019-08-02 PROCEDURE — 99024 POSTOP FOLLOW-UP VISIT: CPT | Performed by: ORTHOPAEDIC SURGERY

## 2019-08-02 PROCEDURE — 71045 X-RAY EXAM CHEST 1 VIEW: CPT

## 2019-08-02 PROCEDURE — 80048 BASIC METABOLIC PNL TOTAL CA: CPT | Performed by: SURGERY

## 2019-08-02 PROCEDURE — G8987 SELF CARE CURRENT STATUS: HCPCS

## 2019-08-02 PROCEDURE — 84100 ASSAY OF PHOSPHORUS: CPT | Performed by: SURGERY

## 2019-08-02 PROCEDURE — G8978 MOBILITY CURRENT STATUS: HCPCS

## 2019-08-02 RX ORDER — NICOTINE 21 MG/24HR
14 PATCH, TRANSDERMAL 24 HOURS TRANSDERMAL DAILY
Status: DISCONTINUED | OUTPATIENT
Start: 2019-08-02 | End: 2019-08-11 | Stop reason: HOSPADM

## 2019-08-02 RX ORDER — MAGNESIUM SULFATE HEPTAHYDRATE 40 MG/ML
2 INJECTION, SOLUTION INTRAVENOUS ONCE
Status: COMPLETED | OUTPATIENT
Start: 2019-08-02 | End: 2019-08-02

## 2019-08-02 RX ORDER — OXYCODONE HYDROCHLORIDE 10 MG/1
20 TABLET ORAL EVERY 4 HOURS PRN
Status: DISCONTINUED | OUTPATIENT
Start: 2019-08-02 | End: 2019-08-11 | Stop reason: HOSPADM

## 2019-08-02 RX ORDER — QUETIAPINE FUMARATE 25 MG/1
50 TABLET, FILM COATED ORAL
Status: DISCONTINUED | OUTPATIENT
Start: 2019-08-02 | End: 2019-08-11 | Stop reason: HOSPADM

## 2019-08-02 RX ORDER — HALOPERIDOL 5 MG/ML
2 INJECTION INTRAMUSCULAR
Status: DISCONTINUED | OUTPATIENT
Start: 2019-08-02 | End: 2019-08-11 | Stop reason: HOSPADM

## 2019-08-02 RX ORDER — LORAZEPAM 2 MG/ML
1 INJECTION INTRAMUSCULAR
Status: DISCONTINUED | OUTPATIENT
Start: 2019-08-02 | End: 2019-08-11 | Stop reason: HOSPADM

## 2019-08-02 RX ORDER — HYDROMORPHONE HCL/PF 1 MG/ML
1 SYRINGE (ML) INJECTION
Status: DISCONTINUED | OUTPATIENT
Start: 2019-08-02 | End: 2019-08-10

## 2019-08-02 RX ORDER — LIDOCAINE 50 MG/G
1 PATCH TOPICAL DAILY
Status: DISCONTINUED | OUTPATIENT
Start: 2019-08-02 | End: 2019-08-11 | Stop reason: HOSPADM

## 2019-08-02 RX ORDER — LANOLIN ALCOHOL/MO/W.PET/CERES
6 CREAM (GRAM) TOPICAL
Status: DISCONTINUED | OUTPATIENT
Start: 2019-08-02 | End: 2019-08-11 | Stop reason: HOSPADM

## 2019-08-02 RX ORDER — GLYCOPYRROLATE 0.2 MG/ML
0.2 INJECTION INTRAMUSCULAR; INTRAVENOUS EVERY 4 HOURS PRN
Status: DISCONTINUED | OUTPATIENT
Start: 2019-08-02 | End: 2019-08-11 | Stop reason: HOSPADM

## 2019-08-02 RX ORDER — ACETAMINOPHEN 325 MG/1
975 TABLET ORAL EVERY 8 HOURS SCHEDULED
Status: DISCONTINUED | OUTPATIENT
Start: 2019-08-02 | End: 2019-08-11 | Stop reason: HOSPADM

## 2019-08-02 RX ORDER — OXYCODONE HYDROCHLORIDE 10 MG/1
10 TABLET ORAL EVERY 4 HOURS PRN
Status: DISCONTINUED | OUTPATIENT
Start: 2019-08-02 | End: 2019-08-02

## 2019-08-02 RX ADMIN — ACETAMINOPHEN 975 MG: 325 TABLET ORAL at 13:51

## 2019-08-02 RX ADMIN — NICOTINE 14 MG: 14 PATCH TRANSDERMAL at 08:13

## 2019-08-02 RX ADMIN — METHOCARBAMOL 750 MG: 750 TABLET, FILM COATED ORAL at 23:40

## 2019-08-02 RX ADMIN — METHOCARBAMOL 750 MG: 750 TABLET, FILM COATED ORAL at 12:20

## 2019-08-02 RX ADMIN — HYDROMORPHONE HYDROCHLORIDE 1 MG: 1 INJECTION, SOLUTION INTRAMUSCULAR; INTRAVENOUS; SUBCUTANEOUS at 16:27

## 2019-08-02 RX ADMIN — Medication 0.2 MG/KG/HR: at 15:01

## 2019-08-02 RX ADMIN — LIDOCAINE 1 PATCH: 50 PATCH CUTANEOUS at 08:15

## 2019-08-02 RX ADMIN — HYDROMORPHONE HYDROCHLORIDE 1 MG: 1 INJECTION, SOLUTION INTRAMUSCULAR; INTRAVENOUS; SUBCUTANEOUS at 21:43

## 2019-08-02 RX ADMIN — METHOCARBAMOL 750 MG: 750 TABLET, FILM COATED ORAL at 05:34

## 2019-08-02 RX ADMIN — MAGNESIUM SULFATE HEPTAHYDRATE 2 G: 40 INJECTION, SOLUTION INTRAVENOUS at 12:14

## 2019-08-02 RX ADMIN — SODIUM CHLORIDE, SODIUM GLUCONATE, SODIUM ACETATE, POTASSIUM CHLORIDE, MAGNESIUM CHLORIDE, SODIUM PHOSPHATE, DIBASIC, AND POTASSIUM PHOSPHATE 110 ML/HR: .53; .5; .37; .037; .03; .012; .00082 INJECTION, SOLUTION INTRAVENOUS at 06:46

## 2019-08-02 RX ADMIN — HYDROMORPHONE HYDROCHLORIDE 0.5 MG: 1 INJECTION, SOLUTION INTRAMUSCULAR; INTRAVENOUS; SUBCUTANEOUS at 03:51

## 2019-08-02 RX ADMIN — OXYCODONE HYDROCHLORIDE 10 MG: 10 TABLET ORAL at 10:10

## 2019-08-02 RX ADMIN — LIDOCAINE 1 PATCH: 50 PATCH CUTANEOUS at 08:14

## 2019-08-02 RX ADMIN — SENNOSIDES 17.2 MG: 8.6 TABLET, FILM COATED ORAL at 21:43

## 2019-08-02 RX ADMIN — DOCUSATE SODIUM 100 MG: 100 CAPSULE, LIQUID FILLED ORAL at 17:19

## 2019-08-02 RX ADMIN — OXYCODONE HYDROCHLORIDE 10 MG: 10 TABLET ORAL at 05:35

## 2019-08-02 RX ADMIN — OXYCODONE HYDROCHLORIDE 20 MG: 10 TABLET ORAL at 20:37

## 2019-08-02 RX ADMIN — ACETAMINOPHEN 975 MG: 325 TABLET ORAL at 21:43

## 2019-08-02 RX ADMIN — OXYCODONE HYDROCHLORIDE 10 MG: 10 TABLET ORAL at 01:51

## 2019-08-02 RX ADMIN — QUETIAPINE FUMARATE 50 MG: 25 TABLET ORAL at 21:43

## 2019-08-02 RX ADMIN — SERTRALINE HYDROCHLORIDE 50 MG: 50 TABLET ORAL at 08:13

## 2019-08-02 RX ADMIN — ACETAMINOPHEN 650 MG: 325 TABLET ORAL at 05:34

## 2019-08-02 RX ADMIN — OXYCODONE HYDROCHLORIDE 20 MG: 10 TABLET ORAL at 13:32

## 2019-08-02 RX ADMIN — METHOCARBAMOL 750 MG: 750 TABLET, FILM COATED ORAL at 17:19

## 2019-08-02 RX ADMIN — MELATONIN 6 MG: 3 TAB ORAL at 21:43
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
## 2019-08-02 NOTE — ASSESSMENT & PLAN NOTE
Rib fracture protocol  Lidocaine patch, scheduled tylenol, Pain control  Regular IS and OOB  Pain Service Consult   - pain 7/10 this morning, need regimen adjustment
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
## 2019-08-02 NOTE — PROGRESS NOTES
Progress Note - Orthopedics   Aurelio Jaimes 30 y o  male MRN: [id number]  Unit/Bed#: ICU 12-01      Subjective:    30 y o male with L LC1 pelvic fracture and left nondisplaced medial femoral condyle fracture  Was taken to IR overnight, for embolization of the spleen  Today he reports pain in his left hip and left knee      Labs:  0   Lab Value Date/Time    HCT 28 2 (L) 08/02/2019 0500    HCT 30 7 (L) 08/01/2019 2335    HCT 29 9 (L) 08/01/2019 1904    HGB 9 5 (L) 08/02/2019 0500    HGB 10 3 (L) 08/01/2019 2335    HGB 10 2 (L) 08/01/2019 1904    INR 1 46 (H) 08/01/2019 1754    WBC 10 28 (H) 08/02/2019 0500    WBC 10 70 (H) 08/01/2019 1649    WBC 8 54 08/01/2019 1439       Meds:    Current Facility-Administered Medications:     acetaminophen (TYLENOL) tablet 975 mg, 975 mg, Oral, Q8H [address], Micheline Jacome PA-C    docusate sodium (COLACE) capsule 100 mg, 100 mg, Oral, BID, Mickie Turpin MD    HYDROmorphone (DILAUDID) injection 0 5 mg, 0 5 mg, Intravenous, Q3H PRN, Mickie Turpin MD, 0 5 mg at 08/02/19 0351    lidocaine (LIDODERM) 5 % patch 1 patch, 1 patch, Topical, Daily, Mickie Turpin MD    lidocaine (LIDODERM) 5 % patch 1 patch, 1 patch, Topical, Daily, Artur Adams PA-C    melatonin tablet 6 mg, 6 mg, Oral, HS, Artur Adams PA-C    methocarbamol (ROBAXIN) tablet 750 mg, 750 mg, Oral, Q6H [address], Mickie Turpin MD, 750 mg at 08/02/19 0534    multi-electrolyte (ISOLYTE-S PH 7 4 equivalent) IV solution, 100 mL/hr, Intravenous, Continuous, Artur Adams PA-C, Last Rate: 110 mL/hr at 08/02/19 0646, 110 mL/hr at 08/02/19 0646    nicotine (NICODERM CQ) 14 mg/24hr TD 24 hr patch 14 mg, 14 mg, Transdermal, Daily, Micheline Jacome PA-C    ondansetron (ZOFRAN) injection 4 mg, 4 mg, Intravenous, Q4H PRN, Mickie Turpin MD    oxyCODONE (ROXICODONE) IR tablet 10 mg, 10 mg, Oral, Q4H PRN, Mickie Turpin MD, 10 mg at 08/02/19 0535    oxyCODONE (ROXICODONE) IR tablet 5 mg, 5 mg, Oral, Q4H PRN, Rosio Ramsey Rosio Burris MD    QUEtiapine (SEROquel) tablet 50 mg, 50 mg, Oral, HS, Malcolm Jacome PA-C    senna (SENOKOT) tablet 17 2 mg, 2 tablet, Oral, HS, Deonna Prado MD    sertraline (ZOLOFT) tablet 50 mg, 50 mg, Oral, Daily, Robert Jacome PA-C    Blood Culture:   No results found for: BLOODCX    Wound Culture:   No results found for: WOUNDCULT    Ins and Outs:  I/O last 24 hours: In: 4321 9 [P O :220; I V :3551 9; Blood:350; IV Piggyback:200]  Out: 675 [Urine:675]          Physical:  Vitals:    08/02/19 0800   BP: 115/57   Pulse: 66   Resp: 13   Temp: 98 6 °F (37 °C)   SpO2: 97%     Musculoskeletal: left Lower Extremity  · VAC dressing applied to left lateral thigh  · Pelvis stable to compressive and rotational stresses  · Tender palpation left hip and left knee  · Able to straight leg raise  · SILT s/s/sp/dp/t    · +fhl/ehl, +ankle dorsi/plantar flexion  · 2+ DP pulse    Assessment:    29 y o male with left LC 1 pelvis fracture and nondisplaced left medial femoral condyle fracture  Patient can be made weight-bearing as tolerated to the bilateral lower extremities  If the patient continues to have difficulties with ambulation, may consider semi-elective fixation of the pelvis injury with SI screws  Will re-evaluate over the weekend pending progress with physical therapy for any future operative discussion  Plan:  · WBAT BL LE     · Would recommend hinged knee brace on locked to the left lower extremity, when able to tolerate given VAC dressing  · PT/OT  · Pain control  · DVT ppx - 28 days  · Dispo: Ortho will follow    Miguel Angel Michaels MD

## 2019-08-02 NOTE — ASSESSMENT & PLAN NOTE
S/p OR closure 8/1  Negative for arterial injury on CTA and Angiogram in IR  Tetanus updated  Wound vac in place, managed by red surgery

## 2019-08-02 NOTE — ASSESSMENT & PLAN NOTE
Grade 5 s/p IR embolization on 8/1 upper and middle pole  Serial abdominal exams  Has received 2U pRBC's this admission   - stable Hb 8/2 9 6, recheck today  Consider restart sq heparin on 8/3  Slowly advance diet
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
## 2019-08-02 NOTE — QUICK NOTE
Post op check - General Surgery  Oneida Mayo 30 y o  male MRN: [id number]  Unit/Bed#: ICU 12 Encounter: [id number]    Assessment:  30 y o  male now 1 Day Post-Op s/p Procedure(s) (LRB):  LEFT LEG WOUND WASHOUT AND DEBRIDEMENT ; WOUND VAC APPLICATION   (Left)  [address] OUT OF LEFT LOWER EXTREMITY WITH LEFT KNEE ASPIRATION (Left)    Plan:  - Diet NPO  - Pain and Nausea control PRN  - IVF    Subjective/Objective     Subjective: The patients pain is well controlled and the patient denies any nausea  Patient complained of needing more sleep  Objective:   Vitals: Blood pressure 121/61, pulse 64, temperature 98 4 °F (36 9 °C), temperature source Oral, resp  rate 19, height 6' (1 829 m), weight 77 2 kg (170 lb 3 1 oz), SpO2 96 %  ,Body mass index is 23 08 kg/m²  I/O       07/31 0701 - 08/01 0700 08/01 0701 - 08/02 0700    P  O   220    I V  (mL/kg)  3416 3 (44 3)    Blood  350    IV Piggyback  200    Total Intake(mL/kg)  4186 3 (54 2)    Urine (mL/kg/hr)  675    Total Output  675    Net  +3511 3                Physical Exam:  Gen: NAD  Chest: Normal work of breathing  Abd: Soft, ND, mild tenderness  Ext:  Left lower extremity wound VAC intact to suction with minimal serosanguineous output, medial dressing CDI      Lab, Imaging and other studies:   CBC with diff:   Lab Results   Component Value Date    WBC 10 28 (H) 08/02/2019    HGB 9 5 (L) 08/02/2019    HCT 28 2 (L) 08/02/2019    MCV 89 08/02/2019    PLT 70 (L) 08/02/2019    MCH 30 0 08/02/2019    MCHC 33 7 08/02/2019    RDW 12 7 08/02/2019    MPV 10 0 08/02/2019    NRBC 0 08/02/2019   , BMP/CMP:   Lab Results   Component Value Date    SODIUM 142 08/02/2019    K 3 7 08/02/2019     08/02/2019    CO2 27 08/02/2019    CO2 28 08/01/2019    BUN 7 08/02/2019    CREATININE 0 72 08/02/2019    GLUCOSE 172 (H) 08/01/2019    CALCIUM 7 8 (L) 08/02/2019    AST 62 (H) 08/01/2019    ALT 73 08/01/2019    ALKPHOS 27 (L) 08/01/2019    EGFR 124 08/02/2019   , Magnesium: No components found for: MAG, Coags:   Lab Results   Component Value Date    PTT 32 08/01/2019    INR 1 46 (H) 08/01/2019   , Blood Culture: No results found for: BLOODCX, Urine Culture: No results found for: URINECX, Wound Culure: No results found for: WOUNDCULT  VTE Pharmacologic Prophylaxis: Sequential compression device (Venodyne)   VTE Mechanical Prophylaxis: sequential compression device
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
## 2019-08-02 NOTE — ASSESSMENT & PLAN NOTE
16cm x 7cm  Washout 8/1 in OR  Wound Vac placed with surgery following  Monitor drainage on negative pressure

## 2019-08-02 NOTE — PLAN OF CARE
Problem: OCCUPATIONAL THERAPY ADULT  Goal: Performs self-care activities at highest level of function for planned discharge setting  See evaluation for individualized goals  Description  Treatment Interventions: ADL retraining, Functional transfer training, UE strengthening/ROM, Endurance training, Patient/family training, Equipment evaluation/education, Compensatory technique education, Continued evaluation, Energy conservation, Activityengagement          See flowsheet documentation for full assessment, interventions and recommendations  Note:   Limitation: Decreased ADL status, Decreased UE ROM, Decreased Safe judgement during ADL, Decreased endurance, Decreased high-level ADLs, Decreased self-care trans  Prognosis: Fair  Assessment: Pt is a 29 y/o male seen for OT eval s[place] [facility] [place]  Pt is dx'd w/ splenic laceration, L LC 1 pelvis fracture, L nondisplaced medial femoral condyle fracture and L knee laceration  Pt is s/p the following procedure "LEFT LEG WOUND WASHOUT AND DEBRIDEMENT ; WOUND VAC APPLICATION   (Left) [address] OUT OF LEFT LOWER EXTREMITY WITH LEFT KNEE ASPIRATION (Left)" performed on 8/1/19  Pt is WBAT in B/L LE and has LLE locked hinge knee brace  Pt  has no past medical history on file  Pt with active OT orders and up with assistance  orders  Pt lives with mother in 2 floor apt w/ 15 SUE, 1st floor setup if needed, but main bed upstairs and bathroom downstairs  Pt was I w/  ADLS and IADLS, drove, & required no use of DME PTA  Pt is currently demonstrating the following occupational deficits: Min A UB ADLS, Mod A LB ADLS, Min A bed mobility and transfers, Mod A functional mobility w/ HHA and +VC for safety   These deficits that are impacting pt's baseline areas of occupation are a result of the following impairments: pain, endurance, activity tolerance, functional mobility, forward functional reach, balance, trunk control, functional standing tolerance, unsupportive home environment, decreased I w/ ADLS/IADLS, ROM and decreased safety awareness  The following Occupational Performance Areas to address include: grooming, bathing/shower, toilet hygiene, dressing, medication management, health maintenance, functional mobility, community mobility, clothing management, household maintenance, job performance/volunteering and social participation  Pt scored overall 55/100 on the Barthel Index  Based on the aforementioned OT evaluation, functional performance deficits, and assessments, pt has been identified as a high complexity evaluation  Recommend STR upon D/C,when medically stable   Pt to continue to benefit from acute immediate OT services to address the following goals 3-5x/week to  w/in 7-10 days:   Recommendation: Physiatry Consult  OT Discharge Recommendation: Short Term Rehab  OT - OK to Discharge: Yes(when medically stable)     Tamanna Sanchez MS, OTR/L

## 2019-08-02 NOTE — ASSESSMENT & PLAN NOTE
Non operative with Orthpedics following  WBAT  Possible operative in future/delayed if pain control an issue  PT/OT consult
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
## 2019-08-02 NOTE — PROGRESS NOTES
Progress Note - Critical Care   Manju Franks 30 y o  male MRN: [id number]  Unit/Bed#: ICU 12 Encounter: [id number]    Assessment:   Principal Problem:    Splenic laceration  Active Problems:    Multiple trauma    Pulmonary contusion    Laceration of left leg    Laceration of left thigh    Fracture of rib of left side    Left acetabular fracture ([address] )    Pulmonary insufficiency  Resolved Problems:    * No resolved hospital problems   *      Plan  Neuro:   Pain  Substance use reported  Anxiety  · Multimodal pain regime with tylenol +lidocaine patch and oxy IR, dilaudid for breakthrough  · Monitor for evidence of withdrawal  · Restart home zoloft and nightly seroquel  · Delirium Precautions  · CAM ICU per protocol  · Regulate sleep/wake cycle+  · melatonin  · Trend neuro exam  CV:   · MAP goal > 65  · Rhythm: NSR  · Follow rhythm on telemetry  Lung:   Rib fractures Left 4-7  Pulmonary contusions  Traumatic Pneumatoceles  Ground glass areas bilateral lungs  · Pulmonary toileting  · Add IS  · Pain control  · Judicious fluids  · Currently on room air  · Spo2 goal >90%  GI:   Grade 5 splenic laceration s/p IR embolization coil and gelfoam of upper and midpole  Hemoperitoneum  NPO  · Continue NPO status  · IVF isolyte  · Monitor H/H Q6hrs and abdominal exams  · Dose splenic vaccinations  · Hold AC and antiplatelets  · Stress ulcer prophylaxis: for stress ulcer prophylaxis   · Bowel regimen: Colace and [place]  FEN:   · Goal 24 hour fluid balance: even   Fluid/Diuretic plan: isolyte  · Nutrition/diet plan: NPO  · Replete electrolytes with goals: K >4 0, Mag >2 0, and Phos >3 0  :   · Indwelling Robertson present: no   · Trend UOP and BUN/creat  · Strict I and O  ID:   · Trend temps and WBC count  · No issues  Heme:   Anemia of blood loss  Thrombocytopenia  · Trend hgb and plts  · Transfuse as needed for goal hgb >7  · CBC am and trend H/H q6  Endo:   · Glycemic control plan: Blood glucose controlled on current regimen  · Goal 140 -180  MSK/Skin:  Poly Trauma s/p long term vs pole  Left Pelvis fracture illium with Left SI widening  Left Acetabular fracture  Left Non displaced medial femoral condyle fracture  Left Knee laceration  Left Thigh laceration s/p washout and wound vac placement  · S/p washout of left leg wounds, wound vac placed  · Left knee interrogated/stressed and joint space not involved - NWB until brace provided  · Ortho following  · Left hip non operative and WBAT  · Mobility goal: NWB left lower until knee brace then OOB to chair  · PT consult: yes  · OT consult: yes  · Frequent turning and pressure off-loading  VTE Prophylaxis:  · Pharmacologic Prophylaxis:hold currently, restart after further H/H observation with high grade splenic laceration  · Mechanical Prophylaxis: sequential compression device    Disposition: Continue ICU care      ______________________________________________________________________  Chief Complaint: abdominal pain      HPI/24hr events: admitted to trauma/surgical ICU after IR embolization of spleen, received 2 u PRBC total, then to OR for washout of left leg wounds, left wound vac applied, ortho stressed the left knee joint, and no joint involvement of the wound  Brought to ICU, hemodynamically stable overnight, no emesis, no fever, no arrhythmia    Admits to snorting/smoking heroin, tobacco use 1 pack day x 13 years, no alcohol       Review of Systems   Constitutional: Negative  HENT: Negative  Respiratory: Negative  Cardiovascular: Negative  Gastrointestinal: Positive for abdominal distention, abdominal pain and nausea  Negative for constipation, diarrhea and vomiting  Genitourinary: Negative  Musculoskeletal: Positive for joint swelling  Left hip pain   Skin: Negative  Neurological: Negative  Hematological: Negative      Psychiatric/Behavioral: Negative       ______________________________________________________________________  Temperature:   Temp (24hrs), Av 5 °F (36 9 °C), Min:98 2 °F (36 8 °C), Max:98 8 °F (37 1 °C)    Current Temperature: 98 4 °F (36 9 °C)    Temp:  [98 2 °F (36 8 °C)-98 8 °F (37 1 °C)] 98 4 °F (36 9 °C)  HR:  [] 64  Resp:  [11-28] 19  BP: ()/(50-72) 121/61    Vitals:    19 0500 19 0530 19 0549 19 0600   BP: 133/52 124/61  121/61   BP Location:  Left arm  Left arm   Pulse: 64 60  64   Resp: 22 (!) 28  19   Temp:       TempSrc:       SpO2: 96% 96%  96%   Weight:   77 2 kg (170 lb 3 1 oz)    Height:                  Weights:   IBW: 77 6 kg    Body mass index is 23 08 kg/m²  Weight (last 2 days)     Date/Time   Weight    19 0549   77 2 (170 2)    19 2301   77 2 (170 2)    19 13:58:41   67 2 (148 15)            Height: 6' (182 9 cm)      Lab Results   Component Value Date    PHART 7 291 (L) 2019    OYO6XMT 54 0 (H) 2019    PO2ART 111 2 2019    LWD1KUW 25 4 2019    BEART -1 6 2019    SOURCE Line, Arterial 2019     SpO2: SpO2: 96 %  ______________________________________________________________________  Physical Exam:     Physical Exam   Constitutional: He appears well-developed  No distress  HENT:   Head: Normocephalic and atraumatic  Nose: Nose normal    Eyes: Pupils are equal, round, and reactive to light  Conjunctivae and EOM are normal    Neck: No JVD present  No tracheal deviation present  Cardiovascular: Normal rate, regular rhythm, normal heart sounds and intact distal pulses  Pulmonary/Chest: Effort normal  No respiratory distress  He has no wheezes  Decreased breath sounds   Abdominal: Soft  He exhibits no distension and no mass  There is tenderness  There is no rebound and no guarding  Musculoskeletal: He exhibits edema and tenderness  He exhibits no deformity     Left leg slight greater than right  Left wound vac in place to neg pressue  Serous sang effluent  Tender over left knee and hip  Neurovascularly in tact distally - palpable dorsalis pedis and post tibial pulse   Neurological: He is alert  No cranial nerve deficit  GCS eye subscore is 4  GCS verbal subscore is 5  GCS motor subscore is 6  GCS 15  No deficits   Skin: Skin is warm  Capillary refill takes less than 2 seconds  He is not diaphoretic  No erythema  Covered in multiple tatoos throughout body   Psychiatric:   Flat affect   Nursing note and vitals reviewed  ______________________________________________________________________  Intake and Outputs:  I/O       07/31 0701 - 08/01 0700 08/01 0701 - 08/02 0700 08/02 0701 - 08/03 0700    P  O   220     I V  (mL/kg)  3416 3 (44 3)     Blood  350     IV Piggyback  200     Total Intake(mL/kg)  4186 3 (54 2)     Urine (mL/kg/hr)  675     Total Output  675     Net  +3511 3                  Nutrition:        Diet Orders   (From admission, onward)             Start     Ordered    08/01/19 1443  Diet NPO  Diet effective now     Question Answer Comment   Diet Type NPO    RD to adjust diet per protocol?  Yes        08/01/19 1444                Labs:   Results from last 7 days   Lab Units 08/02/19  0500 08/01/19  2335 08/01/19  1904 08/01/19  1649 08/01/19  1439   WBC Thousand/uL 10 28*  --   --  10 70* 8 54   HEMOGLOBIN g/dL 9 5* 10 3* 10 2* 9 8* 13 2   HEMATOCRIT % 28 2* 30 7* 29 9* 29 7* 40 3   PLATELETS Thousands/uL 70*  --   --  80* 135*   NEUTROS PCT % 81*  --   --   --  52   MONOS PCT % 11  --   --   --  8    Results from last 7 days   Lab Units 08/02/19  0500 08/01/19  1439 08/01/19  1406   SODIUM mmol/L 142 138  --    POTASSIUM mmol/L 3 7 3 2*  --    CHLORIDE mmol/L 108 103  --    CO2 mmol/L 27 27  --    CO2, I-STAT mmol/L  --   --  28   BUN mg/dL 7 9  --    CREATININE mg/dL 0 72 1 23  --    CALCIUM mg/dL 7 8* 8 9  --    ALK PHOS U/L  --  27*  --    ALT U/L  --  73  --    AST U/L  --  62*  --    GLUCOSE RANDOM mg/dL 108 167*  --      Results from last 7 days   Lab Units 08/02/19  0500   MAGNESIUM mg/dL 1 9     Results from last 7 days   Lab Units 08/02/19  0500   PHOSPHORUS mg/dL 3 1      Results from last 7 days   Lab Units 08/01/19  1754 08/01/19  1439   INR  1 46* 1 19   PTT seconds 32 30     Results from last 7 days   Lab Units 08/01/19  1649   LACTIC ACID mmol/L 0 6     No results found for: TROPONINI  Imaging: CT images reviewed I have personally reviewed pertinent reports  EKG: NSR  Micro:  No results found for: Pat Pointer, WOUNDCULT, SPUTUMCULTUR  Allergies: No Known Allergies  Medications:   Scheduled Meds:  Current Facility-Administered Medications:  acetaminophen 975 mg Oral Q8H [address] Robert Jacome PA-C    docusate sodium 100 mg Oral BID Chanda Dalton MD    HYDROmorphone 0 5 mg Intravenous Q3H PRN Chanda Dalton MD    lidocaine 1 patch Topical Daily Chanda Dalton MD    lidocaine 1 patch Topical Daily Romayne Pounds, PA-C    melatonin 6 mg Oral HS Romayne Pounds, PA-C    methocarbamol 750 mg Oral Q6H [address] Chanda Dalton MD    multi-electrolyte 100 mL/hr Intravenous Continuous Romayne Pounds, PA-C Last Rate: 110 mL/hr (08/02/19 0646)   ondansetron 4 mg Intravenous Q4H PRN Chanda Dalton MD    oxyCODONE 10 mg Oral Q4H PRN Chanda Dalton MD    oxyCODONE 5 mg Oral Q4H PRN Chanda Dalton MD    QUEtiapine 50 mg Oral HS Romayne Pounds, PA-C    senna 2 tablet Oral HS Chanda Dalton MD    sertraline 50 mg Oral Daily Romayne Pounds, PA-C      Continuous Infusions:  multi-electrolyte 100 mL/hr Last Rate: 110 mL/hr (08/02/19 0646)     PRN Meds:    HYDROmorphone 0 5 mg Q3H PRN   ondansetron 4 mg Q4H PRN   oxyCODONE 10 mg Q4H PRN   oxyCODONE 5 mg Q4H PRN       Invasive lines and devices:   Invasive Devices     Peripheral Intravenous Line            Peripheral IV 08/01/19 Left Antecubital less than 1 day    Peripheral IV 08/01/19 Right Antecubital less than 1 day    Peripheral IV 08/01/19 Right;Upper Arm less than 1 day          Drain            Negative Pressure Wound Therapy (V A C ) less than 1 day Counseling / Coordination of Care  Total Critical Care time spent 35 minutes excluding procedures, teaching and family updates  Code Status: Level 1 - Full Code    Portions of the record may have been created with voice recognition software  Occasional wrong word or "sound a like" substitutions may have occurred due to the inherent limitations of voice recognition software  Read the chart carefully and recognize, using context, where substitutions have occurred      Jaylen Macias PA-C

## 2019-08-02 NOTE — ORTHOTIC NOTE
Orthotic Note            Date: 8/2/2019      Patient Name: Jose Luis Quevedo         Time: 12:20pm    Reason for Consult:  Patient Active Problem List   Diagnosis    Multiple trauma    Splenic laceration    Pulmonary contusion    Laceration of left leg    Laceration of left thigh    Fracture of rib of left side    Left acetabular fracture ([address] )    Pulmonary insufficiency    Pelvis ilium fracture (HCC)    Femoral condyle fracture (HCC)    Anxiety   Breg G3 cool Hinged Knee Brace Open to Flexion LLE   Per Orthopedics    I measured, fit, and donned LLE Breg G3 Cool Hinged Knee Brace open to flexion to patient's LLE while he was sitting up in bed  Patient tolerated well without complaint and instructions/adjustments reviewed x'3   My contact information and instructions at bedside  [facility] team and I will continue to follow up daily  Recommendations:  Please call Mobility Coordinator at ext  [phone] in regards to bracing instruction and/or adjustment  Puneet Helms Mobility Coordinator LCF[facility], LCOF, ASOP R  O T, O B T

## 2019-08-02 NOTE — ASSESSMENT & PLAN NOTE
Aggressive IS and OOB  Control pain   - acute pain recs   - robaxin   - lididerom patches   - tylenol   - ketamine infusion

## 2019-08-02 NOTE — PLAN OF CARE
Problem: Prexisting or High Potential for Compromised Skin Integrity  Goal: Skin integrity is maintained or improved  Description  INTERVENTIONS:  - Identify patients at risk for skin breakdown  - Assess and monitor skin integrity  - Assess and monitor nutrition and hydration status  - Monitor labs (i e  albumin)  - Assess for incontinence   - Turn and reposition patient  - Assist with mobility/ambulation  - Relieve pressure over bony prominences  - Avoid friction and shearing  - Provide appropriate hygiene as needed including keeping skin clean and dry  - Evaluate need for skin moisturizer/barrier cream  - Collaborate with interdisciplinary team (i e  Nutrition, Rehabilitation, etc )   - Patient/family teaching  Outcome: Progressing     Problem: Potential for Falls  Goal: Patient will remain free of falls  Description  INTERVENTIONS:  - Assess patient frequently for physical needs  -  Identify cognitive and physical deficits and behaviors that affect risk of falls    -  [place] fall precautions as indicated by assessment   - Educate patient/family on patient safety including physical limitations  - Instruct patient to call for assistance with activity based on assessment  - Modify environment to reduce risk of injury  - Consider OT/PT consult to assist with strengthening/mobility  Outcome: Progressing     Problem: PAIN - ADULT  Goal: Verbalizes/displays adequate comfort level or baseline comfort level  Description  Interventions:  - Encourage patient to monitor pain and request assistance  - Assess pain using appropriate pain scale  - Administer analgesics based on type and severity of pain and evaluate response  - Implement non-pharmacological measures as appropriate and evaluate response  - Consider cultural and social influences on pain and pain management  - Notify physician/advanced practitioner if interventions unsuccessful or patient reports new pain  Outcome: Progressing     Problem: INFECTION - ADULT  Goal: Absence or prevention of progression during hospitalization  Description  INTERVENTIONS:  - Assess and monitor for signs and symptoms of infection  - Monitor lab/diagnostic results  - Monitor all insertion sites, i e  indwelling lines, tubes, and drains  - Monitor endotracheal (as able) and nasal secretions for changes in amount and color  - [place] appropriate cooling/warming therapies per order  - Administer medications as ordered  - Instruct and encourage patient and family to use good hand hygiene technique  - Identify and instruct in appropriate isolation precautions for identified infection/condition  Outcome: Progressing  Goal: Absence of fever/infection during neutropenic period  Description  INTERVENTIONS:  - Monitor WBC  - Implement neutropenic guidelines  Outcome: Progressing     Problem: SAFETY ADULT  Goal: Maintain or return to baseline ADL function  Description  INTERVENTIONS:  -  Assess patient's ability to carry out ADLs; assess patient's baseline for ADL function and identify physical deficits which impact ability to perform ADLs (bathing, care of mouth/teeth, toileting, grooming, dressing, etc )  - Assess/evaluate cause of self-care deficits   - Assess range of motion  - Assess patient's mobility; develop plan if impaired  - Assess patient's need for assistive devices and provide as appropriate  - Encourage maximum independence but intervene and supervise when necessary  ¯ Involve family in performance of ADLs  ¯ Assess for home care needs following discharge   ¯ Request OT consult to assist with ADL evaluation and planning for discharge  ¯ Provide patient education as appropriate  Outcome: Progressing  Goal: Maintain or return mobility status to optimal level  Description  INTERVENTIONS:  - Assess patient's baseline mobility status (ambulation, transfers, stairs, etc )    - Identify cognitive and physical deficits and behaviors that affect mobility  - Identify mobility aids required to assist with transfers and/or ambulation (gait belt, sit-to-stand, lift, walker, cane, etc )  - [place] fall precautions as indicated by assessment  - Record patient progress and toleration of activity level on Mobility SBAR; progress patient to next Phase/Stage  - Instruct patient to call for assistance with activity based on assessment  - Request Rehabilitation consult to assist with strengthening/weightbearing, etc   Outcome: Progressing     Problem: DISCHARGE PLANNING  Goal: Discharge to home or other facility with appropriate resources  Description  INTERVENTIONS:  - Identify barriers to discharge w/patient and caregiver  - Arrange for needed discharge resources and transportation as appropriate  - Identify discharge learning needs (meds, wound care, etc )  - Arrange for interpretive services to assist at discharge as needed  - Refer to Case Management Department for coordinating discharge planning if the patient needs post-hospital services based on physician/advanced practitioner order or complex needs related to functional status, cognitive ability, or social support system  Outcome: Progressing     Problem: Knowledge Deficit  Goal: Patient/family/caregiver demonstrates understanding of disease process, treatment plan, medications, and discharge instructions  Description  Complete learning assessment and assess knowledge base  Interventions:  - Provide teaching at level of understanding  - Provide teaching via preferred learning methods  Outcome: Progressing     Problem: NEUROSENSORY - ADULT  Goal: Achieves stable or improved neurological status  Description  INTERVENTIONS  - Monitor and report changes in neurological status  - Initiate measures to prevent increased intracranial pressure  - Maintain blood pressure and fluid volume within ordered parameters to optimize cerebral perfusion  - Monitor temperature, glucose, and sodium or any other associated labs   Initiate appropriate interventions as ordered  - Monitor for seizure activity   - Administer anti-seizure medications as ordered  Outcome: Progressing     Problem: CARDIOVASCULAR - ADULT  Goal: Maintains optimal cardiac output and hemodynamic stability  Description  INTERVENTIONS:  - Monitor I/O, vital signs and rhythm  - Monitor for S/S and trends of decreased cardiac output i e  bleeding, hypotension  - Administer and titrate ordered vasoactive medications to optimize hemodynamic stability  - Assess quality of pulses, skin color and temperature  - Assess for signs of decreased coronary artery perfusion - ex   Angina  - Instruct patient to report change in severity of symptoms  Outcome: Progressing  Goal: Absence of cardiac dysrhythmias or at baseline rhythm  Description  INTERVENTIONS:  - Continuous cardiac monitoring, monitor vital signs, obtain 12 lead EKG if indicated  - Administer antiarrhythmic and heart rate control medications as ordered  - Monitor electrolytes and administer replacement therapy as ordered  Outcome: Progressing     Problem: RESPIRATORY - ADULT  Goal: Achieves optimal ventilation and oxygenation  Description  INTERVENTIONS:  - Assess for changes in respiratory status  - Assess for changes in mentation and behavior  - Position to facilitate oxygenation and minimize respiratory effort  - Oxygen administration by appropriate delivery method based on oxygen saturation (per order) or ABGs  - Initiate smoking cessation education as indicated  - Encourage broncho-pulmonary hygiene including cough, deep breathe, Incentive Spirometry  - Assess the need for suctioning and aspirate as needed  - Assess and instruct to report SOB or any respiratory difficulty  - Respiratory Therapy support as indicated  Outcome: Progressing     Problem: GASTROINTESTINAL - ADULT  Goal: Minimal or absence of nausea and/or vomiting  Description  INTERVENTIONS:  - Administer IV fluids as ordered to ensure adequate hydration  - Maintain NPO status until nausea and vomiting are resolved  - Nasogastric tube as ordered  - Administer ordered antiemetic medications as needed  - Provide nonpharmacologic comfort measures as appropriate  - Advance diet as tolerated, if ordered  - Nutrition services referral to assist patient with adequate nutrition and appropriate food choices  Outcome: Progressing  Goal: Maintains or returns to baseline bowel function  Description  INTERVENTIONS:  - Assess bowel function  - Encourage oral fluids to ensure adequate hydration  - Administer IV fluids as ordered to ensure adequate hydration  - Administer ordered medications as needed  - Encourage mobilization and activity  - Nutrition services referral to assist patient with appropriate food choices  Outcome: Progressing  Goal: Maintains adequate nutritional intake  Description  INTERVENTIONS:  - Monitor percentage of each meal consumed  - Identify factors contributing to decreased intake, treat as appropriate  - Assist with meals as needed  - Monitor I&O, WT and lab values  - Obtain nutrition services referral as needed  Outcome: Progressing     Problem: GENITOURINARY - ADULT  Goal: Maintains or returns to baseline urinary function  Description  INTERVENTIONS:  - Assess urinary function  - Encourage oral fluids to ensure adequate hydration  - Administer IV fluids as ordered to ensure adequate hydration  - Administer ordered medications as needed  - Offer frequent toileting  - Follow urinary retention protocol if ordered  Outcome: Progressing  Goal: Absence of urinary retention  Description  INTERVENTIONS:  - Assess patients ability to void and empty bladder  - Monitor I/O  - Bladder scan as needed  - Discuss with physician/AP medications to alleviate retention as needed  - Discuss catheterization for long term situations as appropriate  Outcome: Progressing     Problem: METABOLIC, FLUID AND ELECTROLYTES - ADULT  Goal: Electrolytes maintained within normal limits  Description  INTERVENTIONS:  - Monitor labs and assess patient for signs and symptoms of electrolyte imbalances  - Administer electrolyte replacement as ordered  - Monitor response to electrolyte replacements, including repeat lab results as appropriate  - Instruct patient on fluid and nutrition as appropriate  Outcome: Progressing  Goal: Fluid balance maintained  Description  INTERVENTIONS:  - Monitor labs and assess for signs and symptoms of volume excess or deficit  - Monitor I/O and WT  - Instruct patient on fluid and nutrition as appropriate  Outcome: Progressing  Goal: Glucose maintained within target range  Description  INTERVENTIONS:  - Monitor Blood Glucose as ordered  - Assess for signs and symptoms of hyperglycemia and hypoglycemia  - Administer ordered medications to maintain glucose within target range  - Assess nutritional intake and initiate nutrition service referral as needed  Outcome: Progressing     Problem: SKIN/TISSUE INTEGRITY - ADULT  Goal: Skin integrity remains intact  Description  INTERVENTIONS  - Identify patients at risk for skin breakdown  - Assess and monitor skin integrity  - Assess and monitor nutrition and hydration status  - Monitor labs (i e  albumin)  - Assess for incontinence   - Turn and reposition patient  - Assist with mobility/ambulation  - Relieve pressure over bony prominences  - Avoid friction and shearing  - Provide appropriate hygiene as needed including keeping skin clean and dry  - Evaluate need for skin moisturizer/barrier cream  - Collaborate with interdisciplinary team (i e  Nutrition, Rehabilitation, etc )   - Patient/family teaching  Outcome: Progressing  Goal: Incision(s), wounds(s) or drain site(s) healing without S/S of infection  Description  INTERVENTIONS  - Assess and document risk factors for skin impairment   - Assess and document dressing, incision, wound bed, drain sites and surrounding tissue  - Initiate Nutrition services consult and/or wound management as needed  Outcome: Progressing  Goal: Oral mucous membranes remain intact  Description  INTERVENTIONS  - Assess oral mucosa and hygiene practices  - Implement preventative oral hygiene regimen  - Implement oral medicated treatments as ordered  - Initiate Nutrition services referral as needed  Outcome: Progressing     Problem: HEMATOLOGIC - ADULT  Goal: Maintains hematologic stability  Description  INTERVENTIONS  - Assess for signs and symptoms of bleeding or hemorrhage  - Monitor labs  - Administer supportive blood products/factors as ordered and appropriate  Outcome: Progressing     Problem: MUSCULOSKELETAL - ADULT  Goal: Maintain or return mobility to safest level of function  Description  INTERVENTIONS:  - Assess patient's ability to carry out ADLs; assess patient's baseline for ADL function and identify physical deficits which impact ability to perform ADLs (bathing, care of mouth/teeth, toileting, grooming, dressing, etc )  - Assess/evaluate cause of self-care deficits   - Assess range of motion  - Assess patient's mobility; develop plan if impaired  - Assess patient's need for assistive devices and provide as appropriate  - Encourage maximum independence but intervene and supervise when necessary  - Involve family in performance of ADLs  - Assess for home care needs following discharge   - Request OT consult to assist with ADL evaluation and planning for discharge  - Provide patient education as appropriate  Outcome: Progressing  Goal: Maintain proper alignment of affected body part  Description  INTERVENTIONS:  - Support, maintain and protect limb and body alignment  - Provide pt/fam with appropriate education  Outcome: Progressing

## 2019-08-02 NOTE — CONSULTS
Consultation - Inpatient Pain Management   Renetta Jacobsen 30 y o  male MRN: [id number]  Unit/Bed#: ICU 12 Encounter: [id number]               Assessment/Plan     Assessment:     Principal Problem:    Splenic laceration  Active Problems:    Multiple trauma    Pulmonary contusion    Laceration of left leg    Laceration of left thigh    Fracture of rib of left side    Left acetabular fracture (HCC)    Pulmonary insufficiency    Pelvis ilium fracture (HCC)    Femoral condyle fracture (HCC)    Anxiety    Left acetabular fracture  Heroin abuse    Plan/Recommendations:   Would not use start methadone or suboxone at this time in setting of acute injury  Scheduled acetaminophen  Add NSAID unless medically contraindicated  Increase oxycodone to 15-20mg PRN mod to severe  IV Hydromorphone 1 mg PRN breakthrough  Add ketamine infusion  Case Management/Social work consult to help facilitate relationship with opioid abuse resources/Suboxone clinic on discharge  History of Present Illness    Admit Date:  8/1/2019  Hospital Day:  1 day  Primary Service:  Emergency Medicine  Attending Provider:  Boubacar Rodgers MD  Physician Requesting Consult: Boubacar Rodgers MD  Reason for Consult / Principal Problem: Left hip pain  HPI: Renetta Jacobsen is a 30y o  year old male who presents following a [facility]  Suffered injuries described below  Being evaluated for suboxone  "Recreational heroin user 10 bags daily "    CC: "I'm hurting because the medicines aren't enough I think I'm tolerant "     Review of Systems:  Negative except as described above  Pain History:  Current pain location(s): left hip  Pain Scale:   7-10  Severity:  sharp  Aggravating and alleviating factors: none  Pain Relief Goal:  6    Historical Information   History reviewed  No pertinent past medical history    Past Surgical History:   Procedure Laterality Date    IR VISCERAL ANGIOGRAPHY / INTERVENTION  8/1/2019    WOUND DEBRIDEMENT Left 8/1/2019    Procedure: LEFT LEG WOUND WASHOUT AND DEBRIDEMENT ; WOUND VAC APPLICATION  ;  Surgeon: Delmis Wolf MD;  Location: [facility];  Service: General    WOUND DEBRIDEMENT Left 8/1/2019    Procedure: [facility] OUT OF LEFT LOWER EXTREMITY WITH LEFT KNEE ASPIRATION;  Surgeon: Delta Vasquez MD;  Location: [facility];  Service: Orthopedics     Social History   Social History     Substance and Sexual Activity   Alcohol Use Yes    Frequency: Monthly or less    Drinks per session: 1 or 2    Binge frequency: Less than monthly     REPORTS DAILY HEROIN USE- 10 bags inhaled daily   Not weekly    Social History     Tobacco Use   Smoking Status Current Every Day Smoker    Packs/day: 1 00    Types: Cigarettes   Smokeless Tobacco Never Used     Family History: non-contributory    Meds/Allergies   Prior to Admission Medications  Medications Prior to Admission   Medication    sertraline (ZOLOFT) 50 mg tablet     Hospital Medications  Current Facility-Administered Medications   Medication Dose Route Frequency    acetaminophen (TYLENOL) tablet 975 mg  975 mg Oral Q8H [address]    docusate sodium (COLACE) capsule 100 mg  100 mg Oral BID    glycopyrrolate (ROBINUL) injection 0 2 mg  0 2 mg Intravenous Q4H PRN    haloperidol lactate (HALDOL) injection 2 mg  2 mg Intramuscular Q30 Min PRN    HYDROmorphone (DILAUDID) injection 1 mg  1 mg Intravenous Q3H PRN    ketamine 250 mg (STANDARD CONCENTRATION) IV in sodium chloride 0 9% 250 mL  0 2 mg/kg/hr Intravenous Continuous    lidocaine (LIDODERM) 5 % patch 1 patch  1 patch Topical Daily    lidocaine (LIDODERM) 5 % patch 1 patch  1 patch Topical Daily    LORazepam (ATIVAN) 2 mg/mL injection 1 mg  1 mg Intravenous Q1H PRN    melatonin tablet 6 mg  6 mg Oral HS    methocarbamol (ROBAXIN) tablet 750 mg  750 mg Oral Q6H [address]    nicotine (NICODERM CQ) 14 mg/24hr TD 24 hr patch 14 mg  14 mg Transdermal Daily    ondansetron (ZOFRAN) injection 4 mg  4 mg Intravenous Q4H PRN    oxyCODONE (ROXICODONE) immediate release tablet 10 mg  10 mg Oral Q4H PRN    oxyCODONE (ROXICODONE) immediate release tablet 20 mg  20 mg Oral Q4H PRN    QUEtiapine (SEROquel) tablet 50 mg  50 mg Oral HS    senna (SENOKOT) tablet 17 2 mg  2 tablet Oral HS    sertraline (ZOLOFT) tablet 50 mg  50 mg Oral Daily       No Known Allergies    Objective   Temp:  [98 2 °F (36 8 °C)-98 7 °F (37 1 °C)] 98 3 °F (36 8 °C)  HR:  [] 62  Resp:  [10-28] 21  BP: ()/(50-68) 121/66    Intake/Output Summary (Last 24 hours) at 8/2/2019 1533  Last data filed at 8/2/2019 1301  Gross per 24 hour   Intake 5144 26 ml   Output 2750 ml   Net 2394 26 ml       Physical Exam:  General Appearance:    Alert, cooperative, no distress, appears stated age   Neurological:   Oriented to person, place, and time, normal affect    Head:    Normocephalic, without obvious abnormality, atraumatic   Eyes:    EOM's intact   Lungs:     Clear to auscultation bilaterally, respirations unlabored   Chest Wall:    No tenderness or deformity   Abdomen:        Mildy tender, bowel sounds present    Heart:    Regular rate and rhythm, S1 and S2 normal       Pulses:   2+ and symmetric all extremities     Lab Results:   Results from last 7 days   Lab Units 08/02/19  0500   WBC Thousand/uL 10 28*   HEMOGLOBIN g/dL 9 5*   HEMATOCRIT % 28 2*   PLATELETS Thousands/uL 70*      Results from last 7 days   Lab Units 08/02/19  0500 08/01/19  1439  08/01/19  1406   POTASSIUM mmol/L 3 7 3 2*   < >  --    CHLORIDE mmol/L 108 103   < >  --    CO2 mmol/L 27 27   < >  --    CO2, I-STAT mmol/L  --   --   --  28   BUN mg/dL 7 9   < >  --    CREATININE mg/dL 0 72 1 23   < >  --    CALCIUM mg/dL 7 8* 8 9   < >  --    ALK PHOS U/L  --  27*  --   --    ALT U/L  --  73  --   --    AST U/L  --  62*  --   --    GLUCOSE, ISTAT mg/dl  --   --   --  172*    < > = values in this interval not displayed  Imaging Studies: I have personally reviewed pertinent reports      Gr [place], Nondisplaced fx left medial femoral condyle with hemarthrosis  EKG, Pathology, and Other Studies: I have personally reviewed pertinent reports  Counseling / Coordination of Care  Total floor / unit time spent today 45 minutes  Greater than 50% of total time was spent with the patient and / or family counseling and / or coordination of care   A description of the counseling / coordination of care: discussion of analgesic regimen and expectation with patient and trauma team
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
## 2019-08-03 LAB
ABO GROUP BLD BPU: NORMAL
ANION GAP SERPL CALCULATED.3IONS-SCNC: 6 MMOL/L (ref 4–13)
BASOPHILS # BLD AUTO: 0.02 THOUSANDS/ΜL (ref 0–0.1)
BASOPHILS NFR BLD AUTO: 0 % (ref 0–1)
BPU ID: NORMAL
BUN SERPL-MCNC: 6 MG/DL (ref 5–25)
CALCIUM SERPL-MCNC: 8.3 MG/DL (ref 8.3–10.1)
CHLORIDE SERPL-SCNC: 112 MMOL/L (ref 100–108)
CO2 SERPL-SCNC: 27 MMOL/L (ref 21–32)
CREAT SERPL-MCNC: 0.69 MG/DL (ref 0.6–1.3)
CROSSMATCH: NORMAL
EOSINOPHIL # BLD AUTO: 0.01 THOUSAND/ΜL (ref 0–0.61)
EOSINOPHIL NFR BLD AUTO: 0 % (ref 0–6)
ERYTHROCYTE [DISTWIDTH] IN BLOOD BY AUTOMATED COUNT: 12.8 % (ref 11.6–15.1)
GFR SERPL CREATININE-BSD FRML MDRD: 126 ML/MIN/1.73SQ M
GLUCOSE SERPL-MCNC: 109 MG/DL (ref 65–140)
HCT VFR BLD AUTO: 28.6 % (ref 36.5–49.3)
HGB BLD-MCNC: 9.8 G/DL (ref 12–17)
IMM GRANULOCYTES # BLD AUTO: 0.04 THOUSAND/UL (ref 0–0.2)
IMM GRANULOCYTES NFR BLD AUTO: 0 % (ref 0–2)
LYMPHOCYTES # BLD AUTO: 0.99 THOUSANDS/ΜL (ref 0.6–4.47)
LYMPHOCYTES NFR BLD AUTO: 10 % (ref 14–44)
MAGNESIUM SERPL-MCNC: 2.3 MG/DL (ref 1.6–2.6)
MCH RBC QN AUTO: 30.5 PG (ref 26.8–34.3)
MCHC RBC AUTO-ENTMCNC: 34.3 G/DL (ref 31.4–37.4)
MCV RBC AUTO: 89 FL (ref 82–98)
MONOCYTES # BLD AUTO: 1.2 THOUSAND/ΜL (ref 0.17–1.22)
MONOCYTES NFR BLD AUTO: 12 % (ref 4–12)
NEUTROPHILS # BLD AUTO: 7.71 THOUSANDS/ΜL (ref 1.85–7.62)
NEUTS SEG NFR BLD AUTO: 78 % (ref 43–75)
NRBC BLD AUTO-RTO: 0 /100 WBCS
PLATELET # BLD AUTO: 76 THOUSANDS/UL (ref 149–390)
PMV BLD AUTO: 10.6 FL (ref 8.9–12.7)
POTASSIUM SERPL-SCNC: 3.7 MMOL/L (ref 3.5–5.3)
RBC # BLD AUTO: 3.21 MILLION/UL (ref 3.88–5.62)
SODIUM SERPL-SCNC: 145 MMOL/L (ref 136–145)
UNIT DISPENSE STATUS: NORMAL
UNIT PRODUCT CODE: NORMAL
UNIT RH: NORMAL
WBC # BLD AUTO: 9.97 THOUSAND/UL (ref 4.31–10.16)

## 2019-08-03 PROCEDURE — NC001 PR NO CHARGE: Performed by: SURGERY

## 2019-08-03 PROCEDURE — 83735 ASSAY OF MAGNESIUM: CPT | Performed by: EMERGENCY MEDICINE

## 2019-08-03 PROCEDURE — 80048 BASIC METABOLIC PNL TOTAL CA: CPT | Performed by: EMERGENCY MEDICINE

## 2019-08-03 PROCEDURE — 94762 N-INVAS EAR/PLS OXIMTRY CONT: CPT

## 2019-08-03 PROCEDURE — 99233 SBSQ HOSP IP/OBS HIGH 50: CPT | Performed by: SURGERY

## 2019-08-03 PROCEDURE — 85025 COMPLETE CBC W/AUTO DIFF WBC: CPT | Performed by: EMERGENCY MEDICINE

## 2019-08-03 RX ADMIN — QUETIAPINE FUMARATE 50 MG: 25 TABLET ORAL at 21:31

## 2019-08-03 RX ADMIN — HYDROMORPHONE HYDROCHLORIDE 1 MG: 1 INJECTION, SOLUTION INTRAMUSCULAR; INTRAVENOUS; SUBCUTANEOUS at 22:39

## 2019-08-03 RX ADMIN — OXYCODONE HYDROCHLORIDE 20 MG: 10 TABLET ORAL at 17:25

## 2019-08-03 RX ADMIN — MELATONIN 6 MG: 3 TAB ORAL at 21:31

## 2019-08-03 RX ADMIN — NICOTINE 14 MG: 14 PATCH TRANSDERMAL at 08:39

## 2019-08-03 RX ADMIN — ACETAMINOPHEN 975 MG: 325 TABLET ORAL at 13:17

## 2019-08-03 RX ADMIN — DOCUSATE SODIUM 100 MG: 100 CAPSULE, LIQUID FILLED ORAL at 08:39

## 2019-08-03 RX ADMIN — HYDROMORPHONE HYDROCHLORIDE 1 MG: 1 INJECTION, SOLUTION INTRAMUSCULAR; INTRAVENOUS; SUBCUTANEOUS at 05:46

## 2019-08-03 RX ADMIN — OXYCODONE HYDROCHLORIDE 20 MG: 10 TABLET ORAL at 01:53

## 2019-08-03 RX ADMIN — Medication 0.2 MG/KG/HR: at 05:51

## 2019-08-03 RX ADMIN — METHOCARBAMOL 750 MG: 750 TABLET, FILM COATED ORAL at 17:25

## 2019-08-03 RX ADMIN — ACETAMINOPHEN 975 MG: 325 TABLET ORAL at 05:46

## 2019-08-03 RX ADMIN — DOCUSATE SODIUM 100 MG: 100 CAPSULE, LIQUID FILLED ORAL at 17:25

## 2019-08-03 RX ADMIN — Medication 0.3 MG/KG/HR: at 18:17

## 2019-08-03 RX ADMIN — METHOCARBAMOL 750 MG: 750 TABLET, FILM COATED ORAL at 11:40

## 2019-08-03 RX ADMIN — OXYCODONE HYDROCHLORIDE 20 MG: 10 TABLET ORAL at 21:31

## 2019-08-03 RX ADMIN — OXYCODONE HYDROCHLORIDE 20 MG: 10 TABLET ORAL at 08:43

## 2019-08-03 RX ADMIN — OXYCODONE HYDROCHLORIDE 20 MG: 10 TABLET ORAL at 12:47

## 2019-08-03 RX ADMIN — SERTRALINE HYDROCHLORIDE 50 MG: 50 TABLET ORAL at 08:39

## 2019-08-03 RX ADMIN — ACETAMINOPHEN 975 MG: 325 TABLET ORAL at 21:31

## 2019-08-03 RX ADMIN — METHOCARBAMOL 750 MG: 750 TABLET, FILM COATED ORAL at 05:46

## 2019-08-03 RX ADMIN — HYDROMORPHONE HYDROCHLORIDE 1 MG: 1 INJECTION, SOLUTION INTRAMUSCULAR; INTRAVENOUS; SUBCUTANEOUS at 18:34

## 2019-08-03 NOTE — PLAN OF CARE
Problem: Prexisting or High Potential for Compromised Skin Integrity  Goal: Skin integrity is maintained or improved  Description  INTERVENTIONS:  - Identify patients at risk for skin breakdown  - Assess and monitor skin integrity  - Assess and monitor nutrition and hydration status  - Monitor labs (i e  albumin)  - Assess for incontinence   - Turn and reposition patient  - Assist with mobility/ambulation  - Relieve pressure over bony prominences  - Avoid friction and shearing  - Provide appropriate hygiene as needed including keeping skin clean and dry  - Evaluate need for skin moisturizer/barrier cream  - Collaborate with interdisciplinary team (i e  Nutrition, Rehabilitation, etc )   - Patient/family teaching  Outcome: Progressing     Problem: Potential for Falls  Goal: Patient will remain free of falls  Description  INTERVENTIONS:  - Assess patient frequently for physical needs  -  Identify cognitive and physical deficits and behaviors that affect risk of falls    -  [place] fall precautions as indicated by assessment   - Educate patient/family on patient safety including physical limitations  - Instruct patient to call for assistance with activity based on assessment  - Modify environment to reduce risk of injury  - Consider OT/PT consult to assist with strengthening/mobility  Outcome: Progressing     Problem: PAIN - ADULT  Goal: Verbalizes/displays adequate comfort level or baseline comfort level  Description  Interventions:  - Encourage patient to monitor pain and request assistance  - Assess pain using appropriate pain scale  - Administer analgesics based on type and severity of pain and evaluate response  - Implement non-pharmacological measures as appropriate and evaluate response  - Consider cultural and social influences on pain and pain management  - Notify physician/advanced practitioner if interventions unsuccessful or patient reports new pain  Outcome: Progressing     Problem: INFECTION - ADULT  Goal: Absence or prevention of progression during hospitalization  Description  INTERVENTIONS:  - Assess and monitor for signs and symptoms of infection  - Monitor lab/diagnostic results  - Monitor all insertion sites, i e  indwelling lines, tubes, and drains  - Monitor endotracheal (as able) and nasal secretions for changes in amount and color  - [place] appropriate cooling/warming therapies per order  - Administer medications as ordered  - Instruct and encourage patient and family to use good hand hygiene technique  - Identify and instruct in appropriate isolation precautions for identified infection/condition  Outcome: Progressing  Goal: Absence of fever/infection during neutropenic period  Description  INTERVENTIONS:  - Monitor WBC  - Implement neutropenic guidelines  Outcome: Progressing     Problem: SAFETY ADULT  Goal: Maintain or return to baseline ADL function  Description  INTERVENTIONS:  -  Assess patient's ability to carry out ADLs; assess patient's baseline for ADL function and identify physical deficits which impact ability to perform ADLs (bathing, care of mouth/teeth, toileting, grooming, dressing, etc )  - Assess/evaluate cause of self-care deficits   - Assess range of motion  - Assess patient's mobility; develop plan if impaired  - Assess patient's need for assistive devices and provide as appropriate  - Encourage maximum independence but intervene and supervise when necessary  ¯ Involve family in performance of ADLs  ¯ Assess for home care needs following discharge   ¯ Request OT consult to assist with ADL evaluation and planning for discharge  ¯ Provide patient education as appropriate  Outcome: Progressing  Goal: Maintain or return mobility status to optimal level  Description  INTERVENTIONS:  - Assess patient's baseline mobility status (ambulation, transfers, stairs, etc )    - Identify cognitive and physical deficits and behaviors that affect mobility  - Identify mobility aids required to assist with transfers and/or ambulation (gait belt, sit-to-stand, lift, walker, cane, etc )  - [place] fall precautions as indicated by assessment  - Record patient progress and toleration of activity level on Mobility SBAR; progress patient to next Phase/Stage  - Instruct patient to call for assistance with activity based on assessment  - Request Rehabilitation consult to assist with strengthening/weightbearing, etc   Outcome: Progressing     Problem: DISCHARGE PLANNING  Goal: Discharge to home or other facility with appropriate resources  Description  INTERVENTIONS:  - Identify barriers to discharge w/patient and caregiver  - Arrange for needed discharge resources and transportation as appropriate  - Identify discharge learning needs (meds, wound care, etc )  - Arrange for interpretive services to assist at discharge as needed  - Refer to Case Management Department for coordinating discharge planning if the patient needs post-hospital services based on physician/advanced practitioner order or complex needs related to functional status, cognitive ability, or social support system  Outcome: Progressing     Problem: Knowledge Deficit  Goal: Patient/family/caregiver demonstrates understanding of disease process, treatment plan, medications, and discharge instructions  Description  Complete learning assessment and assess knowledge base  Interventions:  - Provide teaching at level of understanding  - Provide teaching via preferred learning methods  Outcome: Progressing     Problem: NEUROSENSORY - ADULT  Goal: Achieves stable or improved neurological status  Description  INTERVENTIONS  - Monitor and report changes in neurological status  - Initiate measures to prevent increased intracranial pressure  - Maintain blood pressure and fluid volume within ordered parameters to optimize cerebral perfusion  - Monitor temperature, glucose, and sodium or any other associated labs   Initiate appropriate interventions as ordered  - Monitor for seizure activity   - Administer anti-seizure medications as ordered  Outcome: Progressing     Problem: CARDIOVASCULAR - ADULT  Goal: Maintains optimal cardiac output and hemodynamic stability  Description  INTERVENTIONS:  - Monitor I/O, vital signs and rhythm  - Monitor for S/S and trends of decreased cardiac output i e  bleeding, hypotension  - Administer and titrate ordered vasoactive medications to optimize hemodynamic stability  - Assess quality of pulses, skin color and temperature  - Assess for signs of decreased coronary artery perfusion - ex   Angina  - Instruct patient to report change in severity of symptoms  Outcome: Progressing  Goal: Absence of cardiac dysrhythmias or at baseline rhythm  Description  INTERVENTIONS:  - Continuous cardiac monitoring, monitor vital signs, obtain 12 lead EKG if indicated  - Administer antiarrhythmic and heart rate control medications as ordered  - Monitor electrolytes and administer replacement therapy as ordered  Outcome: Progressing     Problem: RESPIRATORY - ADULT  Goal: Achieves optimal ventilation and oxygenation  Description  INTERVENTIONS:  - Assess for changes in respiratory status  - Assess for changes in mentation and behavior  - Position to facilitate oxygenation and minimize respiratory effort  - Oxygen administration by appropriate delivery method based on oxygen saturation (per order) or ABGs  - Initiate smoking cessation education as indicated  - Encourage broncho-pulmonary hygiene including cough, deep breathe, Incentive Spirometry  - Assess the need for suctioning and aspirate as needed  - Assess and instruct to report SOB or any respiratory difficulty  - Respiratory Therapy support as indicated  Outcome: Progressing     Problem: GASTROINTESTINAL - ADULT  Goal: Minimal or absence of nausea and/or vomiting  Description  INTERVENTIONS:  - Administer IV fluids as ordered to ensure adequate hydration  - Maintain NPO status until nausea and vomiting are resolved  - Nasogastric tube as ordered  - Administer ordered antiemetic medications as needed  - Provide nonpharmacologic comfort measures as appropriate  - Advance diet as tolerated, if ordered  - Nutrition services referral to assist patient with adequate nutrition and appropriate food choices  Outcome: Progressing  Goal: Maintains or returns to baseline bowel function  Description  INTERVENTIONS:  - Assess bowel function  - Encourage oral fluids to ensure adequate hydration  - Administer IV fluids as ordered to ensure adequate hydration  - Administer ordered medications as needed  - Encourage mobilization and activity  - Nutrition services referral to assist patient with appropriate food choices  Outcome: Progressing  Goal: Maintains adequate nutritional intake  Description  INTERVENTIONS:  - Monitor percentage of each meal consumed  - Identify factors contributing to decreased intake, treat as appropriate  - Assist with meals as needed  - Monitor I&O, WT and lab values  - Obtain nutrition services referral as needed  Outcome: Progressing     Problem: GENITOURINARY - ADULT  Goal: Maintains or returns to baseline urinary function  Description  INTERVENTIONS:  - Assess urinary function  - Encourage oral fluids to ensure adequate hydration  - Administer IV fluids as ordered to ensure adequate hydration  - Administer ordered medications as needed  - Offer frequent toileting  - Follow urinary retention protocol if ordered  Outcome: Progressing  Goal: Absence of urinary retention  Description  INTERVENTIONS:  - Assess patients ability to void and empty bladder  - Monitor I/O  - Bladder scan as needed  - Discuss with physician/AP medications to alleviate retention as needed  - Discuss catheterization for long term situations as appropriate  Outcome: Progressing     Problem: METABOLIC, FLUID AND ELECTROLYTES - ADULT  Goal: Electrolytes maintained within normal limits  Description  INTERVENTIONS:  - Monitor labs and assess patient for signs and symptoms of electrolyte imbalances  - Administer electrolyte replacement as ordered  - Monitor response to electrolyte replacements, including repeat lab results as appropriate  - Instruct patient on fluid and nutrition as appropriate  Outcome: Progressing  Goal: Fluid balance maintained  Description  INTERVENTIONS:  - Monitor labs and assess for signs and symptoms of volume excess or deficit  - Monitor I/O and WT  - Instruct patient on fluid and nutrition as appropriate  Outcome: Progressing  Goal: Glucose maintained within target range  Description  INTERVENTIONS:  - Monitor Blood Glucose as ordered  - Assess for signs and symptoms of hyperglycemia and hypoglycemia  - Administer ordered medications to maintain glucose within target range  - Assess nutritional intake and initiate nutrition service referral as needed  Outcome: Progressing     Problem: SKIN/TISSUE INTEGRITY - ADULT  Goal: Skin integrity remains intact  Description  INTERVENTIONS  - Identify patients at risk for skin breakdown  - Assess and monitor skin integrity  - Assess and monitor nutrition and hydration status  - Monitor labs (i e  albumin)  - Assess for incontinence   - Turn and reposition patient  - Assist with mobility/ambulation  - Relieve pressure over bony prominences  - Avoid friction and shearing  - Provide appropriate hygiene as needed including keeping skin clean and dry  - Evaluate need for skin moisturizer/barrier cream  - Collaborate with interdisciplinary team (i e  Nutrition, Rehabilitation, etc )   - Patient/family teaching  Outcome: Progressing  Goal: Incision(s), wounds(s) or drain site(s) healing without S/S of infection  Description  INTERVENTIONS  - Assess and document risk factors for skin impairment   - Assess and document dressing, incision, wound bed, drain sites and surrounding tissue  - Initiate Nutrition services consult and/or wound management as needed  Outcome: Progressing  Goal: Oral mucous membranes remain intact  Description  INTERVENTIONS  - Assess oral mucosa and hygiene practices  - Implement preventative oral hygiene regimen  - Implement oral medicated treatments as ordered  - Initiate Nutrition services referral as needed  Outcome: Progressing     Problem: HEMATOLOGIC - ADULT  Goal: Maintains hematologic stability  Description  INTERVENTIONS  - Assess for signs and symptoms of bleeding or hemorrhage  - Monitor labs  - Administer supportive blood products/factors as ordered and appropriate  Outcome: Progressing     Problem: MUSCULOSKELETAL - ADULT  Goal: Maintain or return mobility to safest level of function  Description  INTERVENTIONS:  - Assess patient's ability to carry out ADLs; assess patient's baseline for ADL function and identify physical deficits which impact ability to perform ADLs (bathing, care of mouth/teeth, toileting, grooming, dressing, etc )  - Assess/evaluate cause of self-care deficits   - Assess range of motion  - Assess patient's mobility; develop plan if impaired  - Assess patient's need for assistive devices and provide as appropriate  - Encourage maximum independence but intervene and supervise when necessary  - Involve family in performance of ADLs  - Assess for home care needs following discharge   - Request OT consult to assist with ADL evaluation and planning for discharge  - Provide patient education as appropriate  Outcome: Progressing  Goal: Maintain proper alignment of affected body part  Description  INTERVENTIONS:  - Support, maintain and protect limb and body alignment  - Provide pt/fam with appropriate education  Outcome: Progressing

## 2019-08-03 NOTE — PROGRESS NOTES
Acute Pain Service/Anesthesiology:    Patient reports minimal improvement with initiation of ketamine infusion  Reports tossing and turning over night, able to obtain some sleep but not ideal   No side effects of medications noted  Despite self reported pain scores, patient appears well and in NAD  OME consumption acceptable x 24 hours with minimal IV utilization  Increase ketamine to attempt improved analgesia      Sunday Saleh MD  August 3, 2019  10:05 AM

## 2019-08-03 NOTE — PROGRESS NOTES
Progress Note Emiliano Los 1990, 30 y o  male MRN: [id number]    Unit/Bed#: [facility] 628-01 Encounter: [id number]    Primary Care Provider: No primary care provider on file     Date and time admitted to hospital: 8/1/2019  1:56 PM        Anxiety  Assessment & Plan  Continue home zoloft and nightly seroquel    Femoral condyle fracture (HCC)  Assessment & Plan  Ortho following   WBAT  PT/OT  Hinged knee brace ordered    Pelvis ilium fracture (HCC)  Assessment & Plan  Ortho following  WBAT   PT/OT  Pain control  therapy    Pulmonary insufficiency  Assessment & Plan  Improved  On room air      Left acetabular fracture [facility])  Assessment & Plan  Non operative with Orthpedics following  WBAT  Possible operative in future/delayed if pain control an issue  PT/OT consult    Fracture of rib of left side  Assessment & Plan  Rib fracture protocol  Lidocaine patch, scheduled tylenol, Pain control  Regular IS and OOB  Pain Service Consult   - pain 7/10 this morning, need regimen adjustment,  Reviewed note from pain service this morning    Laceration of left thigh  Assessment & Plan  16cm x 7cm  Washout 8/1 in OR  Wound Vac placed with surgery following  Monitor drainage on negative pressure    Laceration of left leg  Assessment & Plan  S/p OR closure 8/1  Negative for arterial injury on CTA and Angiogram in IR  Tetanus updated  Wound vac in place, managed by red surgery, to be changed today    Pulmonary contusion  Assessment & Plan  Aggressive IS and OOB  Control pain   - acute pain recs   - robaxin   - lididerom patches   - tylenol   - ketamine infusion    Multiple trauma  Assessment & Plan  S/p USP vs pole    * Splenic laceration  Assessment & Plan  Grade 5 s/p IR embolization on 8/1 upper and middle pole  Serial abdominal exams  Has received 2U pRBC's this admission   - stable Hb 8/2 9 6, recheck today  Consider restart sq heparin on 8/3 - will speak with attending  Slowly advance diet            Bedside rounds completed with nurse Perez Gomez    Disposition: rehab vs home      SUBJECTIVE:  Chief Complaint: pain    Subjective: I expect to hurt      OBJECTIVE:     Meds/Allergies     Current Facility-Administered Medications:     acetaminophen (TYLENOL) tablet 975 mg, 975 mg, Oral, Q8H [address], Jj Valentin MD, 975 mg at 08/03/19 0546    docusate sodium (COLACE) capsule 100 mg, 100 mg, Oral, BID, Jj Valentin MD, 100 mg at 08/03/19 0839    glycopyrrolate (ROBINUL) injection 0 2 mg, 0 2 mg, Intravenous, Q4H PRN, Jj Valentin MD    haloperidol lactate (HALDOL) injection 2 mg, 2 mg, Intramuscular, Q30 Min PRN, Jj Valentin MD    HYDROmorphone (DILAUDID) injection 1 mg, 1 mg, Intravenous, Q3H PRN, Jj Valentin MD, 1 mg at 08/03/19 0546    ketamine 250 mg (STANDARD CONCENTRATION) IV in sodium chloride 0 9% 250 mL, 0 3 mg/kg/hr, Intravenous, Continuous, Rafael Park MD, Last Rate: 23 2 mL/hr at 08/03/19 1014, 0 3 mg/kg/hr at 08/03/19 1014    lidocaine (LIDODERM) 5 % patch 1 patch, 1 patch, Topical, Daily, Jj Valentin MD, Stopped at 08/03/19 0840    lidocaine (LIDODERM) 5 % patch 1 patch, 1 patch, Topical, Daily, Jj Valentin MD, Stopped at 08/03/19 0839    LORazepam (ATIVAN) 2 mg/mL injection 1 mg, 1 mg, Intravenous, Q1H PRN, Jj Valentin MD    melatonin tablet 6 mg, 6 mg, Oral, HS, Jj Valentin MD, 6 mg at 08/02/19 2143    methocarbamol (ROBAXIN) tablet 750 mg, 750 mg, Oral, Q6H [address], Jj Valentin MD, 750 mg at 08/03/19 0546    nicotine (NICODERM CQ) 14 mg/24hr TD 24 hr patch 14 mg, 14 mg, Transdermal, Daily, Jj Valentin MD, 14 mg at 08/03/19 0839    ondansetron (ZOFRAN) injection 4 mg, 4 mg, Intravenous, Q4H PRN, Jj Valentin MD    oxyCODONE (ROXICODONE) immediate release tablet 20 mg, 20 mg, Oral, Q4H PRN, Jj Valentin MD, 20 mg at 08/03/19 0843    oxyCODONE (ROXICODONE) IR tablet 15 mg, 15 mg, Oral, Q4H PRN, Desiree Chávez MD    QUEtiapine (SEROquel) tablet 50 mg, 50 mg, Oral, HS, Desiree Chávez MD, 50 mg at 08/02/19 2143    senna (SENOKOT) tablet 17 2 mg, 2 tablet, Oral, HS, Desiree Chávez MD, 17 2 mg at 08/02/19 2143    sertraline (ZOLOFT) tablet 50 mg, 50 mg, Oral, Daily, Desiree Chávez MD, 50 mg at 08/03/19 0839     Vitals:   Vitals:    08/03/19 1100   BP: 119/72   Pulse: 81   Resp:    Temp:    SpO2: 98%       Intake/Output:  I/O       08/01 0701 - 08/02 0700 08/02 0701 - 08/03 0700 08/03 0701 - 08/04 0700    P  O  220 1400 1200    I V  (mL/kg) 3416 3 (44 3) 895 7 (11 6)     Blood 350      IV Piggyback 200 90     Total Intake(mL/kg) 4186 3 (54 2) 2385 7 (30 9) 1200 (15 5)    Urine (mL/kg/hr) 675 4950 (2 7) 1600 (4 7)    Total Output 675 4950 1600    Net +3511 3 -2564 3 -400                  Nutrition/GI Proph/Bowel Reg: regular    Physical Exam:   GENERAL APPEARANCE: resting comfortably  NEURO: intact, GCS - 15  HEENT: EOM's intact  CV: RRR< no complaint of chest pain  LUNGS: CTA bilaterally, no shortness of breath  GI: increase to regular diet, bowel regimen  : voiding  MSK: moves all four extremities  SKIN: warm and dry    Invasive Devices     Peripheral Intravenous Line            Peripheral IV 08/01/19 Right Antecubital 1 day    Peripheral IV 08/01/19 Right;Upper Arm 1 day          Drain            Negative Pressure Wound Therapy (V A C ) 1 day                 Lab Results: Results for Josefina Marino (MRN [id number]) as of 8/3/2019 11:48   Ref   Range 8/3/2019 05:23   Sodium Latest Ref Range: 136 - 145 mmol/L 145   Potassium Latest Ref Range: 3 5 - 5 3 mmol/L 3 7   Chloride Latest Ref Range: 100 - 108 mmol/L 112 (H)   CO2 Latest Ref Range: 21 - 32 mmol/L 27   Anion Gap Latest Ref Range: 4 - 13 mmol/L 6   BUN Latest Ref Range: 5 - 25 mg/dL 6   Creatinine Latest Ref Range: 0 60 - 1 30 mg/dL 0 69   Glucose, Random Latest Ref Range: 65 - 140 mg/dL 109   Calcium Latest Ref Range: 8 3 - 10 1 mg/dL 8 3   eGFR Latest Units: ml/min/1 73sq m 126   Magnesium Latest Ref Range: 1 6 - 2 6 mg/dL 2 3   WBC Latest Ref Range: 4 31 - 10 16 Thousand/uL 9 97   Red Blood Cell Count Latest Ref Range: 3 88 - 5 62 Million/uL 3 21 (L)   Hemoglobin Latest Ref Range: 12 0 - 17 0 g/dL 9 8 (L)   HCT Latest Ref Range: 36 5 - 49 3 % 28 6 (L)   MCV Latest Ref Range: 82 - 98 fL 89   MCH Latest Ref Range: 26 8 - 34 3 pg 30 5   MCHC Latest Ref Range: 31 4 - 37 4 g/dL 34 3   RDW Latest Ref Range: 11 6 - 15 1 % 12 8   Platelet Count Latest Ref Range: 149 - 390 Thousands/uL 76 (L)   MPV Latest Ref Range: 8 9 - 12 7 fL 10 6   nRBC Latest Units: /100 WBCs 0   Neutrophils % Latest Ref Range: 43 - 75 % 78 (H)   Immat GRANS % Latest Ref Range: 0 - 2 % 0   Lymphocytes Relative Latest Ref Range: 14 - 44 % 10 (L)   Monocytes Relative Latest Ref Range: 4 - 12 % 12   Eosinophils Latest Ref Range: 0 - 6 % 0   Basophils Relative Latest Ref Range: 0 - 1 % 0   Immature Grans Absolute Latest Ref Range: 0 00 - 0 20 Thousand/uL 0 04   Absolute Neutrophils Latest Ref Range: 1 85 - 7 62 Thousands/µL 7 71 (H)   Lymphocytes Absolute Latest Ref Range: 0 60 - 4 47 Thousands/µL 0 99   Absolute Monocytes Latest Ref Range: 0 17 - 1 22 Thousand/µL 1 20   Absolute Eosinophils Latest Ref Range: 0 00 - 0 61 Thousand/µL 0 01   Basophils Absolute Latest Ref Range: 0 00 - 0 10 Thousands/µL 0 02     Imaging/EKG Studies: none  Other Studies: none  VTE Prophylaxis: Sequential compression device (Venodyne)

## 2019-08-03 NOTE — SOCIAL WORK
Care coordination with Trauma physician, per [facility] pt not medically cleared today  PT recommending Acute Rehab  CM met with patient to discuss rehab options, pt was sleeping  CM left SNF list on pts bedside table

## 2019-08-03 NOTE — ASSESSMENT & PLAN NOTE
S/p OR closure 8/1  Negative for arterial injury on CTA and Angiogram in IR  Tetanus updated  Wound vac in place, managed by red surgery, to be changed today

## 2019-08-03 NOTE — ASSESSMENT & PLAN NOTE
Grade 5 s/p IR embolization on 8/1 upper and middle pole  Serial abdominal exams  Has received 2U pRBC's this admission   - stable Hb 8/2 9 6, recheck today  Consider restart sq heparin on 8/3 - will speak with attending  Slowly advance diet

## 2019-08-03 NOTE — ASSESSMENT & PLAN NOTE
Rib fracture protocol  Lidocaine patch, scheduled tylenol, Pain control  Regular IS and OOB  Pain Service Consult   - pain 7/10 this morning, need regimen adjustment,  Reviewed note from pain service this morning

## 2019-08-03 NOTE — PROGRESS NOTES
Progress Note Lenora Parker 1990, 30 y o  male MRN: [id number]    Unit/Bed#: [facility] 628-01 Encounter: [id number]    Primary Care Provider: No primary care provider on file  Date and time admitted to hospital: 8/1/2019  1:56 PM        Anxiety  Assessment & Plan  Continue home zoloft and nightly seroquel    Femoral condyle fracture (HCC)  Assessment & Plan  Ortho following   WBAT  PT/OT  Hinged knee brace ordered    Pelvis ilium fracture (HCC)  Assessment & Plan  Ortho following  WBAT   PT/OT  Pain control    Pulmonary insufficiency  Assessment & Plan  Improved  On room air      Left acetabular fracture [facility])  Assessment & Plan  Non operative with Orthpedics following  WBAT  Possible operative in future/delayed if pain control an issue  PT/OT consult    Fracture of rib of left side  Assessment & Plan  Rib fracture protocol  Lidocaine patch, scheduled tylenol, Pain control  Regular IS and OOB  Pain Service Consult    Laceration of left thigh  Assessment & Plan  16cm x 7cm  Washout 8/1 in OR  Wound Vac placed with surgery following  Monitor drainage on negative pressure    Laceration of left leg  Assessment & Plan  S/p OR closure 8/1  Negative for arterial injury on CTA and Angiogram in IR  Tetanus updated  Wound vac in place, managed by red surgery    Pulmonary contusion  Assessment & Plan  Aggressive IS and OOB  Control pain   - acute pain recs   - robaxin   - lididerom patches   - tylenol   - ketamine infusion    Multiple trauma  Assessment & Plan  S/p snf vs pole    * Splenic laceration  Assessment & Plan  Grade 5 s/p IR embolization on 8/1 upper and middle pole  Serial abdominal exams  Has received 2U pRBC's this admission   - stable Hb 8/2 9 6, recheck today  Consider restart sq heparin on 8/3  Slowly advance diet            Bedside rounds completed with nurse       Disposition: inpatient      SUBJECTIVE:  Chief Complaint: 7/10 pain    Subjective: Patient complains of continued pain, unchanged from 8/2, denies any new sxs      OBJECTIVE:     Meds/Allergies     Current Facility-Administered Medications:     acetaminophen (TYLENOL) tablet 975 mg, 975 mg, Oral, Q8H [address], Desiree Chávez MD, 975 mg at 08/03/19 0546    docusate sodium (COLACE) capsule 100 mg, 100 mg, Oral, BID, Desiree Chávez MD, 100 mg at 08/02/19 1719    glycopyrrolate (ROBINUL) injection 0 2 mg, 0 2 mg, Intravenous, Q4H PRN, Desiree Chávez MD    haloperidol lactate (HALDOL) injection 2 mg, 2 mg, Intramuscular, Q30 Min PRN, Desiree Chávez MD    HYDROmorphone (DILAUDID) injection 1 mg, 1 mg, Intravenous, Q3H PRN, Desiree Chávez MD, 1 mg at 08/03/19 0546    ketamine 250 mg (STANDARD CONCENTRATION) IV in sodium chloride 0 9% 250 mL, 0 2 mg/kg/hr, Intravenous, Continuous, Desiree Chávez MD, Last Rate: 15 4 mL/hr at 08/03/19 0551, 0 2 mg/kg/hr at 08/03/19 0551    lidocaine (LIDODERM) 5 % patch 1 patch, 1 patch, Topical, Daily, Desiree Chávez MD, 1 patch at 08/02/19 0815    lidocaine (LIDODERM) 5 % patch 1 patch, 1 patch, Topical, Daily, Desiree Chávez MD, 1 patch at 08/02/19 0814    LORazepam (ATIVAN) 2 mg/mL injection 1 mg, 1 mg, Intravenous, Q1H PRN, Desiree Chávez MD    melatonin tablet 6 mg, 6 mg, Oral, HS, Desiree Chávez MD, 6 mg at 08/02/19 2143    methocarbamol (ROBAXIN) tablet 750 mg, 750 mg, Oral, Q6H [address], Desiree Chávez MD, 750 mg at 08/03/19 0546    nicotine (NICODERM CQ) 14 mg/24hr TD 24 hr patch 14 mg, 14 mg, Transdermal, Daily, Desiree Chávez MD, 14 mg at 08/02/19 0813    ondansetron (ZOFRAN) injection 4 mg, 4 mg, Intravenous, Q4H PRN, Desiree Chávez MD    oxyCODONE (ROXICODONE) immediate release tablet 20 mg, 20 mg, Oral, Q4H PRN, Desiree Chávez MD, 20 mg at 08/03/19 0153    oxyCODONE (ROXICODONE) IR tablet 15 mg, 15 mg, Oral, Q4H PRN, Desiree Chávez MD    QUEtiapine (SEROquel) tablet 50 mg, 50 mg, Oral, HS, Desiree Chávez MD, 50 mg at 08/02/19 2143    senna (SENOKOT) tablet 17 2 mg, 2 tablet, Oral, HS, Desiree Chávez MD, 17 2 mg at 08/02/19 2143    sertraline (ZOLOFT) tablet 50 mg, 50 mg, Oral, Daily, Desiree Chávez MD, 50 mg at 08/02/19 0813     Vitals:   Vitals:    08/03/19 0300   BP: 115/61   Pulse: 79   Resp: 18   Temp: 100 °F (37 8 °C)   SpO2: 97%       Intake/Output:  I/O       08/01 0701 - 08/02 0700 08/02 0701 - 08/03 0700    P  O  220 1400    I V  (mL/kg) 3416 3 (44 3) 895 7 (11 6)    Blood 350     IV Piggyback 200 90    Total Intake(mL/kg) 4186 3 (54 2) 2385 7 (30 9)    Urine (mL/kg/hr) 675 4950 (2 7)    Total Output 675 4950    Net +3511 3 -2564 3                 Nutrition/GI Proph/Bowel Reg: senokot    Physical Exam:   Physical Exam   Constitutional: He is oriented to person, place, and time  He appears well-developed and well-nourished  HENT:   Head: Normocephalic and atraumatic  Eyes: Conjunctivae and EOM are normal  No scleral icterus  Neck: Normal range of motion  Neck supple  Cardiovascular: Normal rate and regular rhythm  No murmur heard  Pulmonary/Chest: Effort normal and breath sounds normal  He exhibits tenderness  Abdominal: Soft  Bowel sounds are normal  There is tenderness  Musculoskeletal: Normal range of motion  He exhibits tenderness  Left hip: He exhibits tenderness  Left lower leg: He exhibits laceration  Neurological: He is alert and oriented to person, place, and time  Skin: Skin is warm and dry  Psychiatric: He has a normal mood and affect  His behavior is normal    Nursing note and vitals reviewed  Invasive Devices     Peripheral Intravenous Line            Peripheral IV 08/01/19 Right Antecubital 1 day    Peripheral IV 08/01/19 Right;Upper Arm 1 day          Drain            Negative Pressure Wound Therapy (V A C ) 1 day                 Lab Results:   Results: I have personally reviewed pertinent reports     and CBC:   Lab Results   Component Value Date    HGB 9 6 (L) 08/02/2019    HCT 27 6 (L) 08/02/2019     Imaging/EKG Studies: Results: I have personally reviewed pertinent reports      Other Studies: none  VTE Prophylaxis: Sequential compression device (Venodyne)

## 2019-08-04 LAB
ANION GAP SERPL CALCULATED.3IONS-SCNC: 5 MMOL/L (ref 4–13)
BASOPHILS # BLD AUTO: 0.03 THOUSANDS/ΜL (ref 0–0.1)
BASOPHILS NFR BLD AUTO: 0 % (ref 0–1)
BUN SERPL-MCNC: 7 MG/DL (ref 5–25)
CALCIUM SERPL-MCNC: 8.4 MG/DL (ref 8.3–10.1)
CHLORIDE SERPL-SCNC: 110 MMOL/L (ref 100–108)
CO2 SERPL-SCNC: 26 MMOL/L (ref 21–32)
CREAT SERPL-MCNC: 0.66 MG/DL (ref 0.6–1.3)
EOSINOPHIL # BLD AUTO: 0.04 THOUSAND/ΜL (ref 0–0.61)
EOSINOPHIL NFR BLD AUTO: 0 % (ref 0–6)
ERYTHROCYTE [DISTWIDTH] IN BLOOD BY AUTOMATED COUNT: 13 % (ref 11.6–15.1)
GFR SERPL CREATININE-BSD FRML MDRD: 129 ML/MIN/1.73SQ M
GLUCOSE SERPL-MCNC: 93 MG/DL (ref 65–140)
HCT VFR BLD AUTO: 30.2 % (ref 36.5–49.3)
HGB BLD-MCNC: 10.1 G/DL (ref 12–17)
IMM GRANULOCYTES # BLD AUTO: 0.06 THOUSAND/UL (ref 0–0.2)
IMM GRANULOCYTES NFR BLD AUTO: 1 % (ref 0–2)
LYMPHOCYTES # BLD AUTO: 1.45 THOUSANDS/ΜL (ref 0.6–4.47)
LYMPHOCYTES NFR BLD AUTO: 14 % (ref 14–44)
MCH RBC QN AUTO: 29.6 PG (ref 26.8–34.3)
MCHC RBC AUTO-ENTMCNC: 33.4 G/DL (ref 31.4–37.4)
MCV RBC AUTO: 89 FL (ref 82–98)
MONOCYTES # BLD AUTO: 1.22 THOUSAND/ΜL (ref 0.17–1.22)
MONOCYTES NFR BLD AUTO: 11 % (ref 4–12)
NEUTROPHILS # BLD AUTO: 7.89 THOUSANDS/ΜL (ref 1.85–7.62)
NEUTS SEG NFR BLD AUTO: 74 % (ref 43–75)
NRBC BLD AUTO-RTO: 0 /100 WBCS
PLATELET # BLD AUTO: 94 THOUSANDS/UL (ref 149–390)
PMV BLD AUTO: 9.6 FL (ref 8.9–12.7)
POTASSIUM SERPL-SCNC: 3.7 MMOL/L (ref 3.5–5.3)
RBC # BLD AUTO: 3.41 MILLION/UL (ref 3.88–5.62)
SODIUM SERPL-SCNC: 141 MMOL/L (ref 136–145)
WBC # BLD AUTO: 10.69 THOUSAND/UL (ref 4.31–10.16)

## 2019-08-04 PROCEDURE — 94762 N-INVAS EAR/PLS OXIMTRY CONT: CPT

## 2019-08-04 PROCEDURE — 80048 BASIC METABOLIC PNL TOTAL CA: CPT | Performed by: SURGERY

## 2019-08-04 PROCEDURE — NS001 PR NO SIGNATURE OR ATTESTATION: Performed by: ORTHOPAEDIC SURGERY

## 2019-08-04 PROCEDURE — 99232 SBSQ HOSP IP/OBS MODERATE 35: CPT | Performed by: SURGERY

## 2019-08-04 PROCEDURE — 85025 COMPLETE CBC W/AUTO DIFF WBC: CPT | Performed by: SURGERY

## 2019-08-04 RX ORDER — HEPARIN SODIUM 5000 [USP'U]/ML
5000 INJECTION, SOLUTION INTRAVENOUS; SUBCUTANEOUS EVERY 8 HOURS SCHEDULED
Status: DISCONTINUED | OUTPATIENT
Start: 2019-08-04 | End: 2019-08-11 | Stop reason: HOSPADM

## 2019-08-04 RX ADMIN — ACETAMINOPHEN 975 MG: 325 TABLET ORAL at 13:04

## 2019-08-04 RX ADMIN — METHOCARBAMOL 750 MG: 750 TABLET, FILM COATED ORAL at 17:39

## 2019-08-04 RX ADMIN — ACETAMINOPHEN 975 MG: 325 TABLET ORAL at 06:46

## 2019-08-04 RX ADMIN — HYDROMORPHONE HYDROCHLORIDE 1 MG: 1 INJECTION, SOLUTION INTRAMUSCULAR; INTRAVENOUS; SUBCUTANEOUS at 04:05

## 2019-08-04 RX ADMIN — METHOCARBAMOL 750 MG: 750 TABLET, FILM COATED ORAL at 23:26

## 2019-08-04 RX ADMIN — SENNOSIDES 17.2 MG: 8.6 TABLET, FILM COATED ORAL at 21:19

## 2019-08-04 RX ADMIN — QUETIAPINE FUMARATE 50 MG: 25 TABLET ORAL at 23:24

## 2019-08-04 RX ADMIN — OXYCODONE HYDROCHLORIDE 20 MG: 10 TABLET ORAL at 06:47

## 2019-08-04 RX ADMIN — HEPARIN SODIUM 5000 UNITS: 5000 INJECTION INTRAVENOUS; SUBCUTANEOUS at 15:11

## 2019-08-04 RX ADMIN — Medication 0.3 MG/KG/HR: at 13:10

## 2019-08-04 RX ADMIN — OXYCODONE HYDROCHLORIDE 20 MG: 10 TABLET ORAL at 19:19

## 2019-08-04 RX ADMIN — HYDROMORPHONE HYDROCHLORIDE 1 MG: 1 INJECTION, SOLUTION INTRAMUSCULAR; INTRAVENOUS; SUBCUTANEOUS at 16:29

## 2019-08-04 RX ADMIN — SERTRALINE HYDROCHLORIDE 50 MG: 50 TABLET ORAL at 08:15

## 2019-08-04 RX ADMIN — DOCUSATE SODIUM 100 MG: 100 CAPSULE, LIQUID FILLED ORAL at 17:38

## 2019-08-04 RX ADMIN — METHOCARBAMOL 750 MG: 750 TABLET, FILM COATED ORAL at 11:51

## 2019-08-04 RX ADMIN — OXYCODONE HYDROCHLORIDE 20 MG: 10 TABLET ORAL at 15:13

## 2019-08-04 RX ADMIN — METHOCARBAMOL 750 MG: 750 TABLET, FILM COATED ORAL at 00:00

## 2019-08-04 RX ADMIN — ACETAMINOPHEN 975 MG: 325 TABLET ORAL at 21:19

## 2019-08-04 RX ADMIN — HYDROMORPHONE HYDROCHLORIDE 1 MG: 1 INJECTION, SOLUTION INTRAMUSCULAR; INTRAVENOUS; SUBCUTANEOUS at 20:28

## 2019-08-04 RX ADMIN — HYDROMORPHONE HYDROCHLORIDE 1 MG: 1 INJECTION, SOLUTION INTRAMUSCULAR; INTRAVENOUS; SUBCUTANEOUS at 13:05

## 2019-08-04 RX ADMIN — NICOTINE 14 MG: 14 PATCH TRANSDERMAL at 08:15

## 2019-08-04 RX ADMIN — Medication 0.3 MG/KG/HR: at 23:41

## 2019-08-04 RX ADMIN — DOCUSATE SODIUM 100 MG: 100 CAPSULE, LIQUID FILLED ORAL at 08:15

## 2019-08-04 RX ADMIN — OXYCODONE HYDROCHLORIDE 20 MG: 10 TABLET ORAL at 10:44

## 2019-08-04 RX ADMIN — OXYCODONE HYDROCHLORIDE 20 MG: 10 TABLET ORAL at 02:44

## 2019-08-04 RX ADMIN — METHOCARBAMOL 750 MG: 750 TABLET, FILM COATED ORAL at 06:46

## 2019-08-04 RX ADMIN — OXYCODONE HYDROCHLORIDE 20 MG: 10 TABLET ORAL at 23:25

## 2019-08-04 RX ADMIN — Medication 0.3 MG/KG/HR: at 02:45

## 2019-08-04 RX ADMIN — MELATONIN 6 MG: 3 TAB ORAL at 23:25

## 2019-08-04 RX ADMIN — HYDROMORPHONE HYDROCHLORIDE 1 MG: 1 INJECTION, SOLUTION INTRAMUSCULAR; INTRAVENOUS; SUBCUTANEOUS at 09:54

## 2019-08-04 RX ADMIN — HEPARIN SODIUM 5000 UNITS: 5000 INJECTION INTRAVENOUS; SUBCUTANEOUS at 21:19

## 2019-08-04 NOTE — ASSESSMENT & PLAN NOTE
S/p OR closure 8/1  Negative for arterial injury on CTA and Angiogram in IR  Tetanus updated  Wound vac in place, managed by red surgery - for vac change today

## 2019-08-04 NOTE — ASSESSMENT & PLAN NOTE
Grade 5 s/p IR embolization on 8/1 upper and middle pole  Serial abdominal exams  Has received 2U pRBC's this admission   - stable Hb 8/3, recheck today  Consider restart sq heparin on 8/4 - will speak with attending  Tolerating regular diet

## 2019-08-04 NOTE — ASSESSMENT & PLAN NOTE
Aggressive IS and OOB  Control pain   - acute pain recs   - robaxin   - lididerom patches   - tylenol   - ketamine infusion  Acute Pain following

## 2019-08-04 NOTE — PROGRESS NOTES
Progress Note - Orthopedics   Oneida Force 30 y o  male MRN: [id number]  [facility]      Subjective:    30 y o male with L LC1 pelvis fracture and L min displaced med femoral condyle fracture  Patient reporting significant LLE pain limiting his mobility, primarily in the knee  He states he has been able to walk to the chair and no further       Labs:  0   Lab Value Date/Time    HCT 28 6 (L) 08/03/2019 0523    HCT 27 6 (L) 08/02/2019 1632    HCT 28 2 (L) 08/02/2019 0500    HGB 9 8 (L) 08/03/2019 0523    HGB 9 6 (L) 08/02/2019 1632    HGB 9 5 (L) 08/02/2019 0500    INR 1 46 (H) 08/01/2019 1754    WBC 9 97 08/03/2019 0523    WBC 10 28 (H) 08/02/2019 0500    WBC 10 70 (H) 08/01/2019 1649       Meds:    Current Facility-Administered Medications:     acetaminophen (TYLENOL) tablet 975 mg, 975 mg, Oral, Q8H [address], Jatinder Cartagena MD, 975 mg at 08/04/19 0646    docusate sodium (COLACE) capsule 100 mg, 100 mg, Oral, BID, Jatinder Cartagena MD, 100 mg at 08/03/19 1725    glycopyrrolate (ROBINUL) injection 0 2 mg, 0 2 mg, Intravenous, Q4H PRN, Jatinder Cartagena MD    haloperidol lactate (HALDOL) injection 2 mg, 2 mg, Intramuscular, Q30 Min PRN, Jatinder Cartagena MD    HYDROmorphone (DILAUDID) injection 1 mg, 1 mg, Intravenous, Q3H PRN, Jatinder Cartagena MD, 1 mg at 08/04/19 0405    ketamine 250 mg (STANDARD CONCENTRATION) IV in sodium chloride 0 9% 250 mL, 0 3 mg/kg/hr, Intravenous, Continuous, Colby Alexander MD, Last Rate: 23 2 mL/hr at 08/04/19 0245, 0 3 mg/kg/hr at 08/04/19 0245    lidocaine (LIDODERM) 5 % patch 1 patch, 1 patch, Topical, Daily, Jatinder Cartagena MD, Stopped at 08/03/19 0840    lidocaine (LIDODERM) 5 % patch 1 patch, 1 patch, Topical, Daily, Jatinder Cartagena MD, Stopped at 08/03/19 0839    LORazepam (ATIVAN) 2 mg/mL injection 1 mg, 1 mg, Intravenous, Q1H PRN, Jatinder Cartagena MD    melatonin tablet 6 mg, 6 mg, Oral, HS, Jatinder Cartagena MD, 6 mg at 08/03/19 2131    methocarbamol (ROBAXIN) tablet 750 mg, 750 mg, Oral, Q6H [address], Juan Pablo Calvert MD, 750 mg at 08/04/19 0646    nicotine (NICODERM CQ) 14 mg/24hr TD 24 hr patch 14 mg, 14 mg, Transdermal, Daily, Juan Pablo Calvert MD, 14 mg at 08/03/19 0839    ondansetron (ZOFRAN) injection 4 mg, 4 mg, Intravenous, Q4H PRN, Juan Pablo Calvert MD    oxyCODONE (ROXICODONE) immediate release tablet 20 mg, 20 mg, Oral, Q4H PRN, Juan Pablo Calvert MD, 20 mg at 08/04/19 0647    oxyCODONE (ROXICODONE) IR tablet 15 mg, 15 mg, Oral, Q4H PRN, Juan Pablo Calvert MD    QUEtiapine (SEROquel) tablet 50 mg, 50 mg, Oral, HS, Juan Pablo Calvert MD, 50 mg at 08/03/19 2131    senna (SENOKOT) tablet 17 2 mg, 2 tablet, Oral, HS, Juan Pablo Calvert MD, 17 2 mg at 08/02/19 2143    sertraline (ZOLOFT) tablet 50 mg, 50 mg, Oral, Daily, Juan Pablo aClvert MD, 50 mg at 08/03/19 0839    Blood Culture:   No results found for: BLOODCX    Wound Culture:   No results found for: WOUNDCULT    Ins and Outs:  I/O last 24 hours: In: 4356 6 [P O :3618; I V :738 6]  Out: 6713 [Urine:6600; Drains:215]          Physical:  Vitals:    08/04/19 0250   BP: 120/71   Pulse: 81   Resp: 18   Temp: 99 2 °F (37 3 °C)   SpO2: 99%     Musculoskeletal: left Lower Extremity  · Wound vac and HKB in place  · TTP around the hip and knee  · SILT s/s/sp/dp/t    · +fhl/ehl, +ankle dorsi/plantar flexion  · 2+ DP pulse    Assessment:    29 y o male with L LC1 pelvis fracture and L min displaced med femoral condyle fracture  Will discuss patients progress on rounds today to see if he would be a candidate for pelvic fixation  Continue WBAT in the interim       Plan:  · WBAT bl LE   · PT/OT  · Pain control  · DVT ppx  · Dispo: Ortho will follow    Jamey Lucas MD

## 2019-08-04 NOTE — ASSESSMENT & PLAN NOTE
16cm x 7cm  Washout 8/1 in OR  Wound Vac placed with surgery following - 300cc op, change scheduled for today  Monitor drainage on negative pressure

## 2019-08-04 NOTE — PLAN OF CARE
Problem: Prexisting or High Potential for Compromised Skin Integrity  Goal: Skin integrity is maintained or improved  Description  INTERVENTIONS:  - Identify patients at risk for skin breakdown  - Assess and monitor skin integrity  - Assess and monitor nutrition and hydration status  - Monitor labs (i e  albumin)  - Assess for incontinence   - Turn and reposition patient  - Assist with mobility/ambulation  - Relieve pressure over bony prominences  - Avoid friction and shearing  - Provide appropriate hygiene as needed including keeping skin clean and dry  - Evaluate need for skin moisturizer/barrier cream  - Collaborate with interdisciplinary team (i e  Nutrition, Rehabilitation, etc )   - Patient/family teaching  Outcome: Progressing     Problem: Potential for Falls  Goal: Patient will remain free of falls  Description  INTERVENTIONS:  - Assess patient frequently for physical needs  -  Identify cognitive and physical deficits and behaviors that affect risk of falls    -  [place] fall precautions as indicated by assessment   - Educate patient/family on patient safety including physical limitations  - Instruct patient to call for assistance with activity based on assessment  - Modify environment to reduce risk of injury  - Consider OT/PT consult to assist with strengthening/mobility  Outcome: Progressing     Problem: PAIN - ADULT  Goal: Verbalizes/displays adequate comfort level or baseline comfort level  Description  Interventions:  - Encourage patient to monitor pain and request assistance  - Assess pain using appropriate pain scale  - Administer analgesics based on type and severity of pain and evaluate response  - Implement non-pharmacological measures as appropriate and evaluate response  - Consider cultural and social influences on pain and pain management  - Notify physician/advanced practitioner if interventions unsuccessful or patient reports new pain  Outcome: Progressing     Problem: INFECTION - ADULT  Goal: Absence or prevention of progression during hospitalization  Description  INTERVENTIONS:  - Assess and monitor for signs and symptoms of infection  - Monitor lab/diagnostic results  - Monitor all insertion sites, i e  indwelling lines, tubes, and drains  - Monitor endotracheal (as able) and nasal secretions for changes in amount and color  - [place] appropriate cooling/warming therapies per order  - Administer medications as ordered  - Instruct and encourage patient and family to use good hand hygiene technique  - Identify and instruct in appropriate isolation precautions for identified infection/condition  Outcome: Progressing  Goal: Absence of fever/infection during neutropenic period  Description  INTERVENTIONS:  - Monitor WBC  - Implement neutropenic guidelines  Outcome: Progressing     Problem: SAFETY ADULT  Goal: Maintain or return to baseline ADL function  Description  INTERVENTIONS:  -  Assess patient's ability to carry out ADLs; assess patient's baseline for ADL function and identify physical deficits which impact ability to perform ADLs (bathing, care of mouth/teeth, toileting, grooming, dressing, etc )  - Assess/evaluate cause of self-care deficits   - Assess range of motion  - Assess patient's mobility; develop plan if impaired  - Assess patient's need for assistive devices and provide as appropriate  - Encourage maximum independence but intervene and supervise when necessary  ¯ Involve family in performance of ADLs  ¯ Assess for home care needs following discharge   ¯ Request OT consult to assist with ADL evaluation and planning for discharge  ¯ Provide patient education as appropriate  Outcome: Progressing  Goal: Maintain or return mobility status to optimal level  Description  INTERVENTIONS:  - Assess patient's baseline mobility status (ambulation, transfers, stairs, etc )    - Identify cognitive and physical deficits and behaviors that affect mobility  - Identify mobility aids required to assist with transfers and/or ambulation (gait belt, sit-to-stand, lift, walker, cane, etc )  - [place] fall precautions as indicated by assessment  - Record patient progress and toleration of activity level on Mobility SBAR; progress patient to next Phase/Stage  - Instruct patient to call for assistance with activity based on assessment  - Request Rehabilitation consult to assist with strengthening/weightbearing, etc   Outcome: Progressing     Problem: DISCHARGE PLANNING  Goal: Discharge to home or other facility with appropriate resources  Description  INTERVENTIONS:  - Identify barriers to discharge w/patient and caregiver  - Arrange for needed discharge resources and transportation as appropriate  - Identify discharge learning needs (meds, wound care, etc )  - Arrange for interpretive services to assist at discharge as needed  - Refer to Case Management Department for coordinating discharge planning if the patient needs post-hospital services based on physician/advanced practitioner order or complex needs related to functional status, cognitive ability, or social support system  Outcome: Progressing     Problem: Knowledge Deficit  Goal: Patient/family/caregiver demonstrates understanding of disease process, treatment plan, medications, and discharge instructions  Description  Complete learning assessment and assess knowledge base  Interventions:  - Provide teaching at level of understanding  - Provide teaching via preferred learning methods  Outcome: Progressing     Problem: NEUROSENSORY - ADULT  Goal: Achieves stable or improved neurological status  Description  INTERVENTIONS  - Monitor and report changes in neurological status  - Initiate measures to prevent increased intracranial pressure  - Maintain blood pressure and fluid volume within ordered parameters to optimize cerebral perfusion  - Monitor temperature, glucose, and sodium or any other associated labs   Initiate appropriate interventions as ordered  - Monitor for seizure activity   - Administer anti-seizure medications as ordered  Outcome: Progressing     Problem: CARDIOVASCULAR - ADULT  Goal: Maintains optimal cardiac output and hemodynamic stability  Description  INTERVENTIONS:  - Monitor I/O, vital signs and rhythm  - Monitor for S/S and trends of decreased cardiac output i e  bleeding, hypotension  - Administer and titrate ordered vasoactive medications to optimize hemodynamic stability  - Assess quality of pulses, skin color and temperature  - Assess for signs of decreased coronary artery perfusion - ex   Angina  - Instruct patient to report change in severity of symptoms  Outcome: Progressing  Goal: Absence of cardiac dysrhythmias or at baseline rhythm  Description  INTERVENTIONS:  - Continuous cardiac monitoring, monitor vital signs, obtain 12 lead EKG if indicated  - Administer antiarrhythmic and heart rate control medications as ordered  - Monitor electrolytes and administer replacement therapy as ordered  Outcome: Progressing     Problem: RESPIRATORY - ADULT  Goal: Achieves optimal ventilation and oxygenation  Description  INTERVENTIONS:  - Assess for changes in respiratory status  - Assess for changes in mentation and behavior  - Position to facilitate oxygenation and minimize respiratory effort  - Oxygen administration by appropriate delivery method based on oxygen saturation (per order) or ABGs  - Initiate smoking cessation education as indicated  - Encourage broncho-pulmonary hygiene including cough, deep breathe, Incentive Spirometry  - Assess the need for suctioning and aspirate as needed  - Assess and instruct to report SOB or any respiratory difficulty  - Respiratory Therapy support as indicated  Outcome: Progressing     Problem: GASTROINTESTINAL - ADULT  Goal: Minimal or absence of nausea and/or vomiting  Description  INTERVENTIONS:  - Administer IV fluids as ordered to ensure adequate hydration  - Maintain NPO status until nausea and vomiting are resolved  - Nasogastric tube as ordered  - Administer ordered antiemetic medications as needed  - Provide nonpharmacologic comfort measures as appropriate  - Advance diet as tolerated, if ordered  - Nutrition services referral to assist patient with adequate nutrition and appropriate food choices  Outcome: Progressing  Goal: Maintains or returns to baseline bowel function  Description  INTERVENTIONS:  - Assess bowel function  - Encourage oral fluids to ensure adequate hydration  - Administer IV fluids as ordered to ensure adequate hydration  - Administer ordered medications as needed  - Encourage mobilization and activity  - Nutrition services referral to assist patient with appropriate food choices  Outcome: Progressing  Goal: Maintains adequate nutritional intake  Description  INTERVENTIONS:  - Monitor percentage of each meal consumed  - Identify factors contributing to decreased intake, treat as appropriate  - Assist with meals as needed  - Monitor I&O, WT and lab values  - Obtain nutrition services referral as needed  Outcome: Progressing     Problem: GENITOURINARY - ADULT  Goal: Maintains or returns to baseline urinary function  Description  INTERVENTIONS:  - Assess urinary function  - Encourage oral fluids to ensure adequate hydration  - Administer IV fluids as ordered to ensure adequate hydration  - Administer ordered medications as needed  - Offer frequent toileting  - Follow urinary retention protocol if ordered  Outcome: Progressing  Goal: Absence of urinary retention  Description  INTERVENTIONS:  - Assess patients ability to void and empty bladder  - Monitor I/O  - Bladder scan as needed  - Discuss with physician/AP medications to alleviate retention as needed  - Discuss catheterization for long term situations as appropriate  Outcome: Progressing     Problem: METABOLIC, FLUID AND ELECTROLYTES - ADULT  Goal: Electrolytes maintained within normal limits  Description  INTERVENTIONS:  - Monitor labs and assess patient for signs and symptoms of electrolyte imbalances  - Administer electrolyte replacement as ordered  - Monitor response to electrolyte replacements, including repeat lab results as appropriate  - Instruct patient on fluid and nutrition as appropriate  Outcome: Progressing  Goal: Fluid balance maintained  Description  INTERVENTIONS:  - Monitor labs and assess for signs and symptoms of volume excess or deficit  - Monitor I/O and WT  - Instruct patient on fluid and nutrition as appropriate  Outcome: Progressing  Goal: Glucose maintained within target range  Description  INTERVENTIONS:  - Monitor Blood Glucose as ordered  - Assess for signs and symptoms of hyperglycemia and hypoglycemia  - Administer ordered medications to maintain glucose within target range  - Assess nutritional intake and initiate nutrition service referral as needed  Outcome: Progressing     Problem: SKIN/TISSUE INTEGRITY - ADULT  Goal: Skin integrity remains intact  Description  INTERVENTIONS  - Identify patients at risk for skin breakdown  - Assess and monitor skin integrity  - Assess and monitor nutrition and hydration status  - Monitor labs (i e  albumin)  - Assess for incontinence   - Turn and reposition patient  - Assist with mobility/ambulation  - Relieve pressure over bony prominences  - Avoid friction and shearing  - Provide appropriate hygiene as needed including keeping skin clean and dry  - Evaluate need for skin moisturizer/barrier cream  - Collaborate with interdisciplinary team (i e  Nutrition, Rehabilitation, etc )   - Patient/family teaching  Outcome: Progressing  Goal: Incision(s), wounds(s) or drain site(s) healing without S/S of infection  Description  INTERVENTIONS  - Assess and document risk factors for skin impairment   - Assess and document dressing, incision, wound bed, drain sites and surrounding tissue  - Initiate Nutrition services consult and/or wound management as needed  Outcome: Progressing  Goal: Oral mucous membranes remain intact  Description  INTERVENTIONS  - Assess oral mucosa and hygiene practices  - Implement preventative oral hygiene regimen  - Implement oral medicated treatments as ordered  - Initiate Nutrition services referral as needed  Outcome: Progressing     Problem: HEMATOLOGIC - ADULT  Goal: Maintains hematologic stability  Description  INTERVENTIONS  - Assess for signs and symptoms of bleeding or hemorrhage  - Monitor labs  - Administer supportive blood products/factors as ordered and appropriate  Outcome: Progressing     Problem: MUSCULOSKELETAL - ADULT  Goal: Maintain or return mobility to safest level of function  Description  INTERVENTIONS:  - Assess patient's ability to carry out ADLs; assess patient's baseline for ADL function and identify physical deficits which impact ability to perform ADLs (bathing, care of mouth/teeth, toileting, grooming, dressing, etc )  - Assess/evaluate cause of self-care deficits   - Assess range of motion  - Assess patient's mobility; develop plan if impaired  - Assess patient's need for assistive devices and provide as appropriate  - Encourage maximum independence but intervene and supervise when necessary  - Involve family in performance of ADLs  - Assess for home care needs following discharge   - Request OT consult to assist with ADL evaluation and planning for discharge  - Provide patient education as appropriate  Outcome: Progressing  Goal: Maintain proper alignment of affected body part  Description  INTERVENTIONS:  - Support, maintain and protect limb and body alignment  - Provide pt/fam with appropriate education  Outcome: Progressing

## 2019-08-04 NOTE — ASSESSMENT & PLAN NOTE
Rib fracture protocol  Lidocaine patch, scheduled tylenol, Pain control  Regular IS and OOB  Pain Service Consult, ordering meds   -

## 2019-08-04 NOTE — PROGRESS NOTES
Progress Note Sher Margarita 1990, 30 y o  male MRN: [id number]    Unit/Bed#: [facility] 628-01 Encounter: [id number]    Primary Care Provider: No primary care provider on file  Date and time admitted to hospital: 8/1/2019  1:56 PM        Anxiety  Assessment & Plan  Continue home zoloft and nightly seroquel    Femoral condyle fracture (HCC)  Assessment & Plan  Ortho following - to discuss operative management  WBAT  PT/OT  Hinged knee brace ordered    Pelvis ilium fracture [facility])  Assessment & Plan  Ortho following, to discuss possible operative repair today     WBAT   PT/OT  Pain control      Pulmonary insufficiency  Assessment & Plan  Improved  On room air      Left acetabular fracture [facility])  Assessment & Plan  Non operative with Orthpedics following  WBAT  Possible operative in future/delayed if pain control an issue  PT/OT consult    Fracture of rib of left side  Assessment & Plan  Rib fracture protocol  Lidocaine patch, scheduled tylenol, Pain control  Regular IS and OOB  Pain Service Consult, ordering meds   -     Laceration of left thigh  Assessment & Plan  16cm x 7cm  [place] 8/1 in OR  Wound Vac placed with surgery following - 300cc op, change scheduled for today  Monitor drainage on negative pressure    Laceration of left leg  Assessment & Plan  S/p OR closure 8/1  Negative for arterial injury on CTA and Angiogram in IR  Tetanus updated  Wound vac in place, managed by red surgery - for vac change today    Pulmonary contusion  Assessment & Plan  Aggressive IS and OOB  Control pain   - acute pain recs   - robaxin   - lididerom patches   - tylenol   - ketamine infusion  Acute Pain following    Multiple trauma  Assessment & Plan  S/p group home vs pole    * Splenic laceration  Assessment & Plan  Grade 5 s/p IR embolization on 8/1 upper and middle pole  Serial abdominal exams  Has received 2U pRBC's this admission   - stable Hb 8/3, recheck today  Consider restart sq heparin on 8/4 - will speak with attending  Tolerating regular diet          Bedside rounds completed with nurse  Disposition: ip      SUBJECTIVE:  Chief Complaint: severe pain in chest and L leg    Subjective: Patient appears in no distress, resting comfortably in bed, he complains of severe pain but appears comfortable  Plan for wound vac exchange today with red surgery         OBJECTIVE:     Meds/Allergies     Current Facility-Administered Medications:     acetaminophen (TYLENOL) tablet 975 mg, 975 mg, Oral, Q8H [facility], Candido Cohn MD, 975 mg at 08/04/19 0646    docusate sodium (COLACE) capsule 100 mg, 100 mg, Oral, BID, Candido Cohn MD, 100 mg at 08/04/19 0815    glycopyrrolate (ROBINUL) injection 0 2 mg, 0 2 mg, Intravenous, Q4H PRN, Candido Cohn MD    haloperidol lactate (HALDOL) injection 2 mg, 2 mg, Intramuscular, Q30 Min PRN, Candido Cohn MD    HYDROmorphone (DILAUDID) injection 1 mg, 1 mg, Intravenous, Q3H PRN, Candido Cohn MD, 1 mg at 08/04/19 0954    ketamine 250 mg (STANDARD CONCENTRATION) IV in sodium chloride 0 9% 250 mL, 0 3 mg/kg/hr, Intravenous, Continuous, Dennis Alfred MD, Last Rate: 23 2 mL/hr at 08/04/19 0245, 0 3 mg/kg/hr at 08/04/19 0245    lidocaine (LIDODERM) 5 % patch 1 patch, 1 patch, Topical, Daily, Candido Cohn MD, Stopped at 08/03/19 0840    lidocaine (LIDODERM) 5 % patch 1 patch, 1 patch, Topical, Daily, Candido Cohn MD, Stopped at 08/03/19 0839    LORazepam (ATIVAN) 2 mg/mL injection 1 mg, 1 mg, Intravenous, Q1H PRN, Candido Cohn MD    melatonin tablet 6 mg, 6 mg, Oral, HS, Candido Cohn MD, 6 mg at 08/03/19 2131    methocarbamol (ROBAXIN) tablet 750 mg, 750 mg, Oral, Q6H [facility], Candido Cohn MD, 750 mg at 08/04/19 1151    nicotine (NICODERM CQ) 14 mg/24hr TD 24 hr patch 14 mg, 14 mg, Transdermal, Daily, Candido Cohn MD, 14 mg at 08/04/19 0815    ondansetron (ZOFRAN) injection 4 mg, 4 mg, Intravenous, Q4H PRN, Odilia Peace MD    oxyCODONE (ROXICODONE) immediate release tablet 20 mg, 20 mg, Oral, Q4H PRN, Odilia Peace MD, 20 mg at 08/04/19 1044    oxyCODONE (ROXICODONE) IR tablet 15 mg, 15 mg, Oral, Q4H PRN, Odilia Peace MD    QUEtiapine (SEROquel) tablet 50 mg, 50 mg, Oral, HS, Odilia Peace MD, 50 mg at 08/03/19 2131    senna (SENOKOT) tablet 17 2 mg, 2 tablet, Oral, HS, Odilia Peace MD, 17 2 mg at 08/02/19 2143    sertraline (ZOLOFT) tablet 50 mg, 50 mg, Oral, Daily, Odilia Peace MD, 50 mg at 08/04/19 0815     Vitals:   Vitals:    08/04/19 1200   BP:    Pulse: 83   Resp:    Temp:    SpO2: 98%       Intake/Output:  I/O       08/02 0701 - 08/03 0700 08/03 0701 - 08/04 0700 08/04 0701 - 08/05 0700    P  O  1400 2418     I V  (mL/kg) 895 7 (11 6) 526 1 (6 9)     Blood       IV Piggyback 90      Total Intake(mL/kg) 2385 7 (30 9) 2944 1 (38 6)     Urine (mL/kg/hr) 4950 (2 7) 4125 (2 3) 500 (1 2)    Drains  300     Total Output 4950 4425 500    Net -2564 3 -1480 9 -500                  Nutrition/GI Proph/Bowel Reg: senokot    Physical Exam  Physical Exam   Constitutional: He is oriented to person, place, and time  He appears well-developed and well-nourished  HENT:   Head: Normocephalic and atraumatic  Eyes: Conjunctivae and EOM are normal  No scleral icterus  Neck: Normal range of motion  Neck supple  Cardiovascular: Normal rate and regular rhythm  No murmur heard  Pulmonary/Chest: Effort normal and breath sounds normal  He exhibits tenderness  Abdominal: Soft  Bowel sounds are normal  There is tenderness  Musculoskeletal: Normal range of motion  He exhibits tenderness  Left hip: He exhibits tenderness  Left lower leg: He exhibits laceration  Neurological: He is alert and oriented to person, place, and time  Skin: Skin is warm and dry  Psychiatric: He has a normal mood and affect   His behavior is normal    Nursing note and vitals reviewed           Invasive Devices     Peripheral Intravenous Line            Peripheral IV 08/01/19 Right Antecubital 2 days    Peripheral IV 08/01/19 Right;Upper Arm 2 days          Drain            Negative Pressure Wound Therapy (V A C ) 2 days                 Lab Results: Results: I have personally reviewed pertinent reports  Imaging/EKG Studies: Results: I have personally reviewed pertinent reports      Other Studies: na  VTE Prophylaxis: Sequential compression device (Venodyne)

## 2019-08-05 LAB
ANION GAP SERPL CALCULATED.3IONS-SCNC: 5 MMOL/L (ref 4–13)
BASOPHILS # BLD AUTO: 0.03 THOUSANDS/ΜL (ref 0–0.1)
BASOPHILS NFR BLD AUTO: 0 % (ref 0–1)
BUN SERPL-MCNC: 9 MG/DL (ref 5–25)
CALCIUM SERPL-MCNC: 8.5 MG/DL (ref 8.3–10.1)
CHLORIDE SERPL-SCNC: 108 MMOL/L (ref 100–108)
CO2 SERPL-SCNC: 25 MMOL/L (ref 21–32)
CREAT SERPL-MCNC: 0.66 MG/DL (ref 0.6–1.3)
EOSINOPHIL # BLD AUTO: 0.1 THOUSAND/ΜL (ref 0–0.61)
EOSINOPHIL NFR BLD AUTO: 1 % (ref 0–6)
ERYTHROCYTE [DISTWIDTH] IN BLOOD BY AUTOMATED COUNT: 12.9 % (ref 11.6–15.1)
GFR SERPL CREATININE-BSD FRML MDRD: 129 ML/MIN/1.73SQ M
GLUCOSE SERPL-MCNC: 87 MG/DL (ref 65–140)
HCT VFR BLD AUTO: 29.6 % (ref 36.5–49.3)
HGB BLD-MCNC: 9.9 G/DL (ref 12–17)
IMM GRANULOCYTES # BLD AUTO: 0.03 THOUSAND/UL (ref 0–0.2)
IMM GRANULOCYTES NFR BLD AUTO: 0 % (ref 0–2)
LYMPHOCYTES # BLD AUTO: 1.44 THOUSANDS/ΜL (ref 0.6–4.47)
LYMPHOCYTES NFR BLD AUTO: 16 % (ref 14–44)
MCH RBC QN AUTO: 29.7 PG (ref 26.8–34.3)
MCHC RBC AUTO-ENTMCNC: 33.4 G/DL (ref 31.4–37.4)
MCV RBC AUTO: 89 FL (ref 82–98)
MONOCYTES # BLD AUTO: 1.04 THOUSAND/ΜL (ref 0.17–1.22)
MONOCYTES NFR BLD AUTO: 12 % (ref 4–12)
NEUTROPHILS # BLD AUTO: 6.25 THOUSANDS/ΜL (ref 1.85–7.62)
NEUTS SEG NFR BLD AUTO: 71 % (ref 43–75)
NRBC BLD AUTO-RTO: 0 /100 WBCS
PLATELET # BLD AUTO: 129 THOUSANDS/UL (ref 149–390)
PMV BLD AUTO: 9.5 FL (ref 8.9–12.7)
POTASSIUM SERPL-SCNC: 3.6 MMOL/L (ref 3.5–5.3)
RBC # BLD AUTO: 3.33 MILLION/UL (ref 3.88–5.62)
SODIUM SERPL-SCNC: 138 MMOL/L (ref 136–145)
WBC # BLD AUTO: 8.89 THOUSAND/UL (ref 4.31–10.16)

## 2019-08-05 PROCEDURE — 80048 BASIC METABOLIC PNL TOTAL CA: CPT | Performed by: STUDENT IN AN ORGANIZED HEALTH CARE EDUCATION/TRAINING PROGRAM

## 2019-08-05 PROCEDURE — 97535 SELF CARE MNGMENT TRAINING: CPT

## 2019-08-05 PROCEDURE — 99232 SBSQ HOSP IP/OBS MODERATE 35: CPT | Performed by: PHYSICIAN ASSISTANT

## 2019-08-05 PROCEDURE — 94762 N-INVAS EAR/PLS OXIMTRY CONT: CPT

## 2019-08-05 PROCEDURE — 85025 COMPLETE CBC W/AUTO DIFF WBC: CPT | Performed by: NURSE PRACTITIONER

## 2019-08-05 RX ADMIN — HYDROMORPHONE HYDROCHLORIDE 1 MG: 1 INJECTION, SOLUTION INTRAMUSCULAR; INTRAVENOUS; SUBCUTANEOUS at 17:27

## 2019-08-05 RX ADMIN — HEPARIN SODIUM 5000 UNITS: 5000 INJECTION INTRAVENOUS; SUBCUTANEOUS at 21:04

## 2019-08-05 RX ADMIN — HEPARIN SODIUM 5000 UNITS: 5000 INJECTION INTRAVENOUS; SUBCUTANEOUS at 05:25

## 2019-08-05 RX ADMIN — METHOCARBAMOL 750 MG: 750 TABLET, FILM COATED ORAL at 17:26

## 2019-08-05 RX ADMIN — NICOTINE 14 MG: 14 PATCH TRANSDERMAL at 08:14

## 2019-08-05 RX ADMIN — SERTRALINE HYDROCHLORIDE 50 MG: 50 TABLET ORAL at 08:14

## 2019-08-05 RX ADMIN — OXYCODONE HYDROCHLORIDE 20 MG: 10 TABLET ORAL at 16:14

## 2019-08-05 RX ADMIN — OXYCODONE HYDROCHLORIDE 20 MG: 10 TABLET ORAL at 12:16

## 2019-08-05 RX ADMIN — QUETIAPINE FUMARATE 50 MG: 25 TABLET ORAL at 21:03

## 2019-08-05 RX ADMIN — DOCUSATE SODIUM 100 MG: 100 CAPSULE, LIQUID FILLED ORAL at 08:14

## 2019-08-05 RX ADMIN — OXYCODONE HYDROCHLORIDE 20 MG: 10 TABLET ORAL at 20:58

## 2019-08-05 RX ADMIN — ACETAMINOPHEN 975 MG: 325 TABLET ORAL at 05:25

## 2019-08-05 RX ADMIN — METHOCARBAMOL 750 MG: 750 TABLET, FILM COATED ORAL at 11:10

## 2019-08-05 RX ADMIN — HYDROMORPHONE HYDROCHLORIDE 1 MG: 1 INJECTION, SOLUTION INTRAMUSCULAR; INTRAVENOUS; SUBCUTANEOUS at 08:50

## 2019-08-05 RX ADMIN — HEPARIN SODIUM 5000 UNITS: 5000 INJECTION INTRAVENOUS; SUBCUTANEOUS at 13:16

## 2019-08-05 RX ADMIN — HYDROMORPHONE HYDROCHLORIDE 1 MG: 1 INJECTION, SOLUTION INTRAMUSCULAR; INTRAVENOUS; SUBCUTANEOUS at 13:16

## 2019-08-05 RX ADMIN — SENNOSIDES 17.2 MG: 8.6 TABLET, FILM COATED ORAL at 21:03

## 2019-08-05 RX ADMIN — ACETAMINOPHEN 975 MG: 325 TABLET ORAL at 21:02

## 2019-08-05 RX ADMIN — METHOCARBAMOL 750 MG: 750 TABLET, FILM COATED ORAL at 05:25

## 2019-08-05 RX ADMIN — ACETAMINOPHEN 975 MG: 325 TABLET ORAL at 13:16

## 2019-08-05 RX ADMIN — Medication 0.3 MG/KG/HR: at 11:10

## 2019-08-05 RX ADMIN — OXYCODONE HYDROCHLORIDE 20 MG: 10 TABLET ORAL at 03:34

## 2019-08-05 RX ADMIN — HYDROMORPHONE HYDROCHLORIDE 1 MG: 1 INJECTION, SOLUTION INTRAMUSCULAR; INTRAVENOUS; SUBCUTANEOUS at 22:54

## 2019-08-05 RX ADMIN — Medication 0.3 MG/KG/HR: at 23:47

## 2019-08-05 RX ADMIN — METHOCARBAMOL 750 MG: 750 TABLET, FILM COATED ORAL at 23:46

## 2019-08-05 RX ADMIN — MELATONIN 6 MG: 3 TAB ORAL at 21:03

## 2019-08-05 RX ADMIN — OXYCODONE HYDROCHLORIDE 20 MG: 10 TABLET ORAL at 08:17

## 2019-08-05 RX ADMIN — DOCUSATE SODIUM 100 MG: 100 CAPSULE, LIQUID FILLED ORAL at 17:26

## 2019-08-05 NOTE — PROGRESS NOTES
Acute Pain Service/Anesthesiology:    Patient reports improved analgesia with increased ketamine dose  Overall, reports that despite significant pain, he thinks things are going pretty well  Limited IV opioid utilization and stable oral medications  Continue ketamine at current dose, will wean prior to discharge      Louisa Lundborg, MD  August 5, 2019  11:29 AM

## 2019-08-05 NOTE — OCCUPATIONAL THERAPY NOTE
Occupational Therapy Treatment Note:       08/05/19 7880   Restrictions/Precautions   LLE Weight Bearing Per Order WBAT   Braces or Orthoses LE Braces  (hinged knee brace l le, ketamine iv)   Other Precautions   (vac dressing, iv,)   Pain Assessment   Pain Assessment 0-10   Pain Score 7   Pain Location Abdomen  (ribs)   Pain Orientation Left   ADL   Where Assessed Edge of bed   LB Dressing Assistance 4  Minimal Assistance   LB Dressing Deficit Thread LLE into pants   LB Dressing Comments asst for l knee brace lower clip, pt unable to feach, mother will assist him per pt report   Toileting Assistance  5  Supervision/Setup   Toileting Deficit Clothing management down;Clothing management up   Functional Standing Tolerance   Time 5 min during clothing management and functional mobility with rw, f+ balance   Bed Mobility   Supine to Sit 5  Supervision   Sit to Supine 5  Supervision   Transfers   Sit to Stand 5  Supervision   Additional items   (to from low stuffed arm chair)   Stand to Sit 5  Supervision   Cognition   Overall Cognitive Status WFL   Activity Tolerance   Activity Tolerance Patient tolerated treatment well   Assessment   Assessment pt participated in pm ot session and was seen focusing on bedm obility, functional transfers and mobility with rw, vac management for dressing and l le brace jose eduardo / doff seated eob during adls  pt was able to reach lb for dressing except for l lower knee brace clip  per pt his mother will assist  pt will require a rw at this time  pt is hopeful vac will be off by time pt is d/c'ed from hospital  plan to continue to focus on adls and management of l le brace  pt reports being able to amange bathing this am without assistance  pt was premedicated for pain prior to ot session  Plan   Treatment Interventions ADL retraining;Functional transfer training; Activityengagement; Endurance training;Patient/family training;Equipment evaluation/education   Goal Expiration Date 08/12/19 Treatment Day 1   OT Frequency 3-5x/wk   Recommendation   OT Discharge Recommendation Home with family support  (pending stairs with p t )   Barthel Index   Feeding 10   Bathing 5   Grooming Score 5   Dressing Score 5   Bladder Score 10   Bowels Score 10   Toilet Use Score 5   Transfers (Bed/Chair) Score 10   Mobility (Level Surface) Score 0   Stairs Score 0   Barthel Index Score 60   Modified Juan Scale   Modified Sylva Scale 4   April A CHUY Huber

## 2019-08-05 NOTE — PROGRESS NOTES
Progress Note Sharyle Jacobson 1990, 30 y o  male MRN: [id number]    Unit/Bed#: [facility] 628-01 Encounter: [id number]    Primary Care Provider: No primary care provider on file  Date and time admitted to hospital: 8/1/2019  1:56 PM        Anxiety  Assessment & Plan  Continue home zoloft and nightly seroquel    Femoral condyle fracture (HCC)  Assessment & Plan  Ortho following   WBAT  PT/OT  Hinged knee brace ordered    Pelvis ilium fracture (HCC)  Assessment & Plan  Ortho following  WBAT   PT/OT  Pain control      Left acetabular fracture (HCC)  Assessment & Plan  Non operative with Orthpedics following  WBAT  PT/OT     Fracture of rib of left side  Assessment & Plan  Rib fracture protocol  APS following       Laceration of left leg  Assessment & Plan  S/P OR closure 8/1  Negative for arterial injury on CTA and Angiogram in IR  Tetanus updated  Wound VAC in place    Pulmonary contusion  Assessment & Plan  Aggressive IS and OOB  Control pain   - acute pain recs   - robaxin   - lididerom patches   - tylenol   - ketamine infusion  Acute Pain following    * Splenic laceration  Assessment & Plan  Grade 5 s/p IR embolization on 8/1 upper and middle pole  Serial abdominal exams  Has received 2U pRBC's this admission  Tolerating regular diet            Bedside rounds completed with nurse Emiliano Macias  Disposition: pending      SUBJECTIVE:  Chief Complaint: left thigh pain    Subjective: Still with left thigh pain    Using prn pain meds      OBJECTIVE:     Meds/Allergies     Current Facility-Administered Medications:     acetaminophen (TYLENOL) tablet 975 mg, 975 mg, Oral, Q8H [address], Roiso Sharif MD, 975 mg at 08/05/19 0525    docusate sodium (COLACE) capsule 100 mg, 100 mg, Oral, BID, Rosio Sharif MD, 100 mg at 08/05/19 0814    glycopyrrolate (ROBINUL) injection 0 2 mg, 0 2 mg, Intravenous, Q4H PRN, Rosio Sharif MD    haloperidol lactate (HALDOL) injection 2 mg, 2 mg, Intramuscular, Q30 Min PRN, Shelvia Spatz, MD    heparin (porcine) subcutaneous injection 5,000 Units, 5,000 Units, Subcutaneous, Q8H [address], [address], 5,000 Units at 08/05/19 1785    HYDROmorphone (DILAUDID) injection 1 mg, 1 mg, Intravenous, Q3H PRN, Shelvia Spatz, MD, 1 mg at 08/05/19 0850    ketamine 250 mg (STANDARD CONCENTRATION) IV in sodium chloride 0 9% 250 mL, 0 3 mg/kg/hr, Intravenous, Continuous, Godwin Burger MD, Last Rate: 23 2 mL/hr at 08/05/19 1110, 0 3 mg/kg/hr at 08/05/19 1110    lidocaine (LIDODERM) 5 % patch 1 patch, 1 patch, Topical, Daily, Shelvia Spatz, MD, Stopped at 08/03/19 0840    lidocaine (LIDODERM) 5 % patch 1 patch, 1 patch, Topical, Daily, Shelvia Spatz, MD, Stopped at 08/03/19 0839    LORazepam (ATIVAN) 2 mg/mL injection 1 mg, 1 mg, Intravenous, Q1H PRN, Shelvia Spatz, MD    melatonin tablet 6 mg, 6 mg, Oral, HS, Shelvia Spatz, MD, 6 mg at 08/04/19 2325    methocarbamol (ROBAXIN) tablet 750 mg, 750 mg, Oral, Q6H [address], Shelvia Spatz, MD, 750 mg at 08/05/19 1110    nicotine (NICODERM CQ) 14 mg/24hr TD 24 hr patch 14 mg, 14 mg, Transdermal, Daily, Shelvia Spatz, MD, 14 mg at 08/05/19 0814    ondansetron (ZOFRAN) injection 4 mg, 4 mg, Intravenous, Q4H PRN, Shelvia Spatz, MD    oxyCODONE (ROXICODONE) immediate release tablet 20 mg, 20 mg, Oral, Q4H PRN, Shelvia Spatz, MD, 20 mg at 08/05/19 0817    oxyCODONE (ROXICODONE) IR tablet 15 mg, 15 mg, Oral, Q4H PRN, Shelvia Spatz, MD    QUEtiapine (SEROquel) tablet 50 mg, 50 mg, Oral, HS, Shelvia Spatz, MD, 50 mg at 08/04/19 2324    senna (SENOKOT) tablet 17 2 mg, 2 tablet, Oral, HS, Shelvia Spatz, MD, 17 2 mg at 08/04/19 2119    sertraline (ZOLOFT) tablet 50 mg, 50 mg, Oral, Daily, Shelvia Spatz, MD, 50 mg at 08/05/19 0814     Vitals:   Vitals:    08/05/19 1037   BP: 102/61   Pulse: 84   Resp: 18   Temp:    SpO2: 97%       Intake/Output:  I/O       08/03 7162 - 08/04 0700 08/04 0701 - 08/05 0700 08/05 0701 - 08/06 0700    P  O  2418 420 480    I V  (mL/kg) 526 1 (6 9) 557 2 (7 3)     IV Piggyback       Total Intake(mL/kg) 2944 1 (38 6) 977 2 (12 8) 480 (6 3)    Urine (mL/kg/hr) 4125 (2 3) 2470 (1 4) 500 (1 2)    Drains 300 85     Total Output 4425 2555 500    Net -1480 9 -1577 8 -20                  Nutrition/GI Proph/Bowel Reg: Colace, Senokot    Physical Exam:   Constitution: patient lying in bed, appears comfortable  HEENT: normocephalic, atraumatic, 3 mm ILDA, clear speech  CV: regular rate and rhythm, no edema, +2 DP pulses  Pulm: CTA, no wheezes, rhonchi or crackles, unlabored, equal bilaterally  Abd: soft, nontender, nondistended, active bowel sounds  Musc: moves all extremities, equal strength  Neuro: A&O, no focal deficits  Skin: VAC in place left leg          Invasive Devices     Peripheral Intravenous Line            Peripheral IV 08/01/19 Right Antecubital 3 days    Peripheral IV 08/01/19 Right;Upper Arm 3 days          Drain            Negative Pressure Wound Therapy (V A C ) 3 days                 Lab Results: Results: I have personally reviewed pertinent reports  Imaging/EKG Studies: Results: I have personally reviewed pertinent reports      Other Studies: n/a  VTE Prophylaxis: Heparin

## 2019-08-05 NOTE — ASSESSMENT & PLAN NOTE
Grade 5 s/p IR embolization on 8/1 upper and middle pole  Serial abdominal exams  Has received 2U pRBC's this admission  Tolerating regular diet

## 2019-08-05 NOTE — PLAN OF CARE
Problem: OCCUPATIONAL THERAPY ADULT  Goal: Performs self-care activities at highest level of function for planned discharge setting  See evaluation for individualized goals  Description  Treatment Interventions: ADL retraining, Functional transfer training, UE strengthening/ROM, Endurance training, Patient/family training, Equipment evaluation/education, Compensatory technique education, Continued evaluation, Energy conservation, Activityengagement          See flowsheet documentation for full assessment, interventions and recommendations  Outcome: Progressing  Note:   Limitation: Decreased ADL status, Decreased UE ROM, Decreased Safe judgement during ADL, Decreased endurance, Decreased high-level ADLs, Decreased self-care trans  Prognosis: Fair  Assessment: pt participated in pm ot session and was seen focusing on bedm obility, functional transfers and mobility with rw, vac management for dressing and l le brace jose eduardo / doff seated eob during adls  pt was able to reach lb for dressing except for l lower knee brace clip  per pt his mother will assist  pt will require a rw at this time  pt is hopeful vac will be off by time pt is d/c'ed from hospital  plan to continue to focus on adls and management of l le brace  pt reports being able to amange bathing this am without assistance  pt was premedicated for pain prior to ot session    Recommendation: [address]  OT Discharge Recommendation: Home with family support(pending stairs with p t )  OT - OK to Discharge: Yes(when medically stable)     CHUY Ward

## 2019-08-05 NOTE — DISCHARGE INSTRUCTIONS
Discharge Instructions - Orthopedics  Cassidy Fernandez 30 y o  male MRN: [id number]  Unit/Bed#: [facility] 628-01    Weight Bearing Status:                                            weightbearing as tolerated left lower extremity in hinged knee brace    DVT prophylaxis:  Complete course of  DVT prophylaxis as instructed    Pain:  Continue analgesics as directed    PT/OT:  Continue PT/OT on outpatient basis as directed    Appt Instructions: If you do not have your appointment, please call the clinic at [phone] to f/u with Dr Faizan Coleman in  2 weeks    Contact the office sooner if you experience any increased numbness/tingling in the extremities  Miscellaneous:  None      Seek medical attn if you develop worsening chest pain or shortness of breath, dizziness/lightheadness, abdominal pain, nausea/vomiting,  fevers/chills or sweats  No heavy lifting, pushing or pulling >10 pounds  No strenuous physical activity  Avoid repeat trauma to the abdomen given your splenic laceration  Avoid crowded areas/malls/festivals, etc  Leave the home only when essential (doctor's appts, etc) until seen and cleared by trauma in 2 weeks  No work or driving while taking narcotic pain medications and until cleared by trauma

## 2019-08-05 NOTE — ASSESSMENT & PLAN NOTE
S/P OR closure 8/1  Negative for arterial injury on CTA and Angiogram in IR  Tetanus updated  Wound VAC in place

## 2019-08-05 NOTE — SOCIAL WORK
Pt was recommended for IP rehab last week   CM will need updated therapy notes to discern if pt has progressed or is still a rehab rec
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
## 2019-08-06 PROCEDURE — 97530 THERAPEUTIC ACTIVITIES: CPT

## 2019-08-06 PROCEDURE — 99232 SBSQ HOSP IP/OBS MODERATE 35: CPT | Performed by: PHYSICIAN ASSISTANT

## 2019-08-06 PROCEDURE — 97110 THERAPEUTIC EXERCISES: CPT

## 2019-08-06 PROCEDURE — 97116 GAIT TRAINING THERAPY: CPT

## 2019-08-06 RX ORDER — HYDROMORPHONE HCL/PF 1 MG/ML
0.5 SYRINGE (ML) INJECTION ONCE
Status: COMPLETED | OUTPATIENT
Start: 2019-08-06 | End: 2019-08-06

## 2019-08-06 RX ADMIN — OXYCODONE HYDROCHLORIDE 20 MG: 10 TABLET ORAL at 20:31

## 2019-08-06 RX ADMIN — DOCUSATE SODIUM 100 MG: 100 CAPSULE, LIQUID FILLED ORAL at 08:38

## 2019-08-06 RX ADMIN — ACETAMINOPHEN 975 MG: 325 TABLET ORAL at 13:04

## 2019-08-06 RX ADMIN — NICOTINE 14 MG: 14 PATCH TRANSDERMAL at 08:38

## 2019-08-06 RX ADMIN — METHOCARBAMOL 750 MG: 750 TABLET, FILM COATED ORAL at 06:00

## 2019-08-06 RX ADMIN — HYDROMORPHONE HYDROCHLORIDE 1 MG: 1 INJECTION, SOLUTION INTRAMUSCULAR; INTRAVENOUS; SUBCUTANEOUS at 21:41

## 2019-08-06 RX ADMIN — OXYCODONE HYDROCHLORIDE 20 MG: 10 TABLET ORAL at 16:02

## 2019-08-06 RX ADMIN — QUETIAPINE FUMARATE 50 MG: 25 TABLET ORAL at 21:36

## 2019-08-06 RX ADMIN — HYDROMORPHONE HYDROCHLORIDE 1 MG: 1 INJECTION, SOLUTION INTRAMUSCULAR; INTRAVENOUS; SUBCUTANEOUS at 17:30

## 2019-08-06 RX ADMIN — DOCUSATE SODIUM 100 MG: 100 CAPSULE, LIQUID FILLED ORAL at 17:30

## 2019-08-06 RX ADMIN — OXYCODONE HYDROCHLORIDE 20 MG: 10 TABLET ORAL at 10:46

## 2019-08-06 RX ADMIN — HEPARIN SODIUM 5000 UNITS: 5000 INJECTION INTRAVENOUS; SUBCUTANEOUS at 21:37

## 2019-08-06 RX ADMIN — ACETAMINOPHEN 975 MG: 325 TABLET ORAL at 06:00

## 2019-08-06 RX ADMIN — HYDROMORPHONE HYDROCHLORIDE 0.5 MG: 1 INJECTION, SOLUTION INTRAMUSCULAR; INTRAVENOUS; SUBCUTANEOUS at 10:15

## 2019-08-06 RX ADMIN — HEPARIN SODIUM 5000 UNITS: 5000 INJECTION INTRAVENOUS; SUBCUTANEOUS at 06:00

## 2019-08-06 RX ADMIN — Medication 0.3 MG/KG/HR: at 21:39

## 2019-08-06 RX ADMIN — METHOCARBAMOL 750 MG: 750 TABLET, FILM COATED ORAL at 12:23

## 2019-08-06 RX ADMIN — SENNOSIDES 17.2 MG: 8.6 TABLET, FILM COATED ORAL at 21:36

## 2019-08-06 RX ADMIN — METHOCARBAMOL 750 MG: 750 TABLET, FILM COATED ORAL at 17:30

## 2019-08-06 RX ADMIN — HEPARIN SODIUM 5000 UNITS: 5000 INJECTION INTRAVENOUS; SUBCUTANEOUS at 13:04

## 2019-08-06 RX ADMIN — SERTRALINE HYDROCHLORIDE 50 MG: 50 TABLET ORAL at 08:38

## 2019-08-06 RX ADMIN — ACETAMINOPHEN 975 MG: 325 TABLET ORAL at 21:36

## 2019-08-06 RX ADMIN — OXYCODONE HYDROCHLORIDE 20 MG: 10 TABLET ORAL at 06:00

## 2019-08-06 RX ADMIN — HYDROMORPHONE HYDROCHLORIDE 1 MG: 1 INJECTION, SOLUTION INTRAMUSCULAR; INTRAVENOUS; SUBCUTANEOUS at 13:04

## 2019-08-06 RX ADMIN — MELATONIN 6 MG: 3 TAB ORAL at 21:36

## 2019-08-06 RX ADMIN — Medication 0.3 MG/KG/HR: at 12:23

## 2019-08-06 RX ADMIN — HYDROMORPHONE HYDROCHLORIDE 1 MG: 1 INJECTION, SOLUTION INTRAMUSCULAR; INTRAVENOUS; SUBCUTANEOUS at 08:43

## 2019-08-06 NOTE — PLAN OF CARE
Problem: Prexisting or High Potential for Compromised Skin Integrity  Goal: Skin integrity is maintained or improved  Description  INTERVENTIONS:  - Identify patients at risk for skin breakdown  - Assess and monitor skin integrity  - Assess and monitor nutrition and hydration status  - Monitor labs (i e  albumin)  - Assess for incontinence   - Turn and reposition patient  - Assist with mobility/ambulation  - Relieve pressure over bony prominences  - Avoid friction and shearing  - Provide appropriate hygiene as needed including keeping skin clean and dry  - Evaluate need for skin moisturizer/barrier cream  - Collaborate with interdisciplinary team (i e  Nutrition, Rehabilitation, etc )   - Patient/family teaching  Outcome: Progressing     Problem: Potential for Falls  Goal: Patient will remain free of falls  Description  INTERVENTIONS:  - Assess patient frequently for physical needs  -  Identify cognitive and physical deficits and behaviors that affect risk of falls    -  [place] fall precautions as indicated by assessment   - Educate patient/family on patient safety including physical limitations  - Instruct patient to call for assistance with activity based on assessment  - Modify environment to reduce risk of injury  - Consider OT/PT consult to assist with strengthening/mobility  Outcome: Progressing     Problem: PAIN - ADULT  Goal: Verbalizes/displays adequate comfort level or baseline comfort level  Description  Interventions:  - Encourage patient to monitor pain and request assistance  - Assess pain using appropriate pain scale  - Administer analgesics based on type and severity of pain and evaluate response  - Implement non-pharmacological measures as appropriate and evaluate response  - Consider cultural and social influences on pain and pain management  - Notify physician/advanced practitioner if interventions unsuccessful or patient reports new pain  Outcome: Progressing     Problem: INFECTION - ADULT  Goal: Absence or prevention of progression during hospitalization  Description  INTERVENTIONS:  - Assess and monitor for signs and symptoms of infection  - Monitor lab/diagnostic results  - Monitor all insertion sites, i e  indwelling lines, tubes, and drains  - Monitor endotracheal (as able) and nasal secretions for changes in amount and color  - [place] appropriate cooling/warming therapies per order  - Administer medications as ordered  - Instruct and encourage patient and family to use good hand hygiene technique  - Identify and instruct in appropriate isolation precautions for identified infection/condition  Outcome: Progressing  Goal: Absence of fever/infection during neutropenic period  Description  INTERVENTIONS:  - Monitor WBC  - Implement neutropenic guidelines  Outcome: Progressing     Problem: SAFETY ADULT  Goal: Maintain or return to baseline ADL function  Description  INTERVENTIONS:  -  Assess patient's ability to carry out ADLs; assess patient's baseline for ADL function and identify physical deficits which impact ability to perform ADLs (bathing, care of mouth/teeth, toileting, grooming, dressing, etc )  - Assess/evaluate cause of self-care deficits   - Assess range of motion  - Assess patient's mobility; develop plan if impaired  - Assess patient's need for assistive devices and provide as appropriate  - Encourage maximum independence but intervene and supervise when necessary  ¯ Involve family in performance of ADLs  ¯ Assess for home care needs following discharge   ¯ Request OT consult to assist with ADL evaluation and planning for discharge  ¯ Provide patient education as appropriate  Outcome: Progressing  Goal: Maintain or return mobility status to optimal level  Description  INTERVENTIONS:  - Assess patient's baseline mobility status (ambulation, transfers, stairs, etc )    - Identify cognitive and physical deficits and behaviors that affect mobility  - Identify mobility aids required to assist with transfers and/or ambulation (gait belt, sit-to-stand, lift, walker, cane, etc )  - [place] fall precautions as indicated by assessment  - Record patient progress and toleration of activity level on Mobility SBAR; progress patient to next Phase/Stage  - Instruct patient to call for assistance with activity based on assessment  - Request Rehabilitation consult to assist with strengthening/weightbearing, etc   Outcome: Progressing     Problem: DISCHARGE PLANNING  Goal: Discharge to home or other facility with appropriate resources  Description  INTERVENTIONS:  - Identify barriers to discharge w/patient and caregiver  - Arrange for needed discharge resources and transportation as appropriate  - Identify discharge learning needs (meds, wound care, etc )  - Arrange for interpretive services to assist at discharge as needed  - Refer to Case Management Department for coordinating discharge planning if the patient needs post-hospital services based on physician/advanced practitioner order or complex needs related to functional status, cognitive ability, or social support system  Outcome: Progressing     Problem: Knowledge Deficit  Goal: Patient/family/caregiver demonstrates understanding of disease process, treatment plan, medications, and discharge instructions  Description  Complete learning assessment and assess knowledge base  Interventions:  - Provide teaching at level of understanding  - Provide teaching via preferred learning methods  Outcome: Progressing     Problem: NEUROSENSORY - ADULT  Goal: Achieves stable or improved neurological status  Description  INTERVENTIONS  - Monitor and report changes in neurological status  - Initiate measures to prevent increased intracranial pressure  - Maintain blood pressure and fluid volume within ordered parameters to optimize cerebral perfusion  - Monitor temperature, glucose, and sodium or any other associated labs   Initiate appropriate interventions as ordered  - Monitor for seizure activity   - Administer anti-seizure medications as ordered  Outcome: Progressing     Problem: CARDIOVASCULAR - ADULT  Goal: Maintains optimal cardiac output and hemodynamic stability  Description  INTERVENTIONS:  - Monitor I/O, vital signs and rhythm  - Monitor for S/S and trends of decreased cardiac output i e  bleeding, hypotension  - Administer and titrate ordered vasoactive medications to optimize hemodynamic stability  - Assess quality of pulses, skin color and temperature  - Assess for signs of decreased coronary artery perfusion - ex   Angina  - Instruct patient to report change in severity of symptoms  Outcome: Progressing  Goal: Absence of cardiac dysrhythmias or at baseline rhythm  Description  INTERVENTIONS:  - Continuous cardiac monitoring, monitor vital signs, obtain 12 lead EKG if indicated  - Administer antiarrhythmic and heart rate control medications as ordered  - Monitor electrolytes and administer replacement therapy as ordered  Outcome: Progressing     Problem: RESPIRATORY - ADULT  Goal: Achieves optimal ventilation and oxygenation  Description  INTERVENTIONS:  - Assess for changes in respiratory status  - Assess for changes in mentation and behavior  - Position to facilitate oxygenation and minimize respiratory effort  - Oxygen administration by appropriate delivery method based on oxygen saturation (per order) or ABGs  - Initiate smoking cessation education as indicated  - Encourage broncho-pulmonary hygiene including cough, deep breathe, Incentive Spirometry  - Assess the need for suctioning and aspirate as needed  - Assess and instruct to report SOB or any respiratory difficulty  - Respiratory Therapy support as indicated  Outcome: Progressing     Problem: GASTROINTESTINAL - ADULT  Goal: Minimal or absence of nausea and/or vomiting  Description  INTERVENTIONS:  - Administer IV fluids as ordered to ensure adequate hydration  - Maintain NPO status until nausea and vomiting are resolved  - Nasogastric tube as ordered  - Administer ordered antiemetic medications as needed  - Provide nonpharmacologic comfort measures as appropriate  - Advance diet as tolerated, if ordered  - Nutrition services referral to assist patient with adequate nutrition and appropriate food choices  Outcome: Progressing  Goal: Maintains or returns to baseline bowel function  Description  INTERVENTIONS:  - Assess bowel function  - Encourage oral fluids to ensure adequate hydration  - Administer IV fluids as ordered to ensure adequate hydration  - Administer ordered medications as needed  - Encourage mobilization and activity  - Nutrition services referral to assist patient with appropriate food choices  Outcome: Progressing  Goal: Maintains adequate nutritional intake  Description  INTERVENTIONS:  - Monitor percentage of each meal consumed  - Identify factors contributing to decreased intake, treat as appropriate  - Assist with meals as needed  - Monitor I&O, WT and lab values  - Obtain nutrition services referral as needed  Outcome: Progressing     Problem: GENITOURINARY - ADULT  Goal: Maintains or returns to baseline urinary function  Description  INTERVENTIONS:  - Assess urinary function  - Encourage oral fluids to ensure adequate hydration  - Administer IV fluids as ordered to ensure adequate hydration  - Administer ordered medications as needed  - Offer frequent toileting  - Follow urinary retention protocol if ordered  Outcome: Progressing  Goal: Absence of urinary retention  Description  INTERVENTIONS:  - Assess patients ability to void and empty bladder  - Monitor I/O  - Bladder scan as needed  - Discuss with physician/AP medications to alleviate retention as needed  - Discuss catheterization for long term situations as appropriate  Outcome: Progressing     Problem: METABOLIC, FLUID AND ELECTROLYTES - ADULT  Goal: Electrolytes maintained within normal limits  Description  INTERVENTIONS:  - Monitor labs and assess patient for signs and symptoms of electrolyte imbalances  - Administer electrolyte replacement as ordered  - Monitor response to electrolyte replacements, including repeat lab results as appropriate  - Instruct patient on fluid and nutrition as appropriate  Outcome: Progressing  Goal: Fluid balance maintained  Description  INTERVENTIONS:  - Monitor labs and assess for signs and symptoms of volume excess or deficit  - Monitor I/O and WT  - Instruct patient on fluid and nutrition as appropriate  Outcome: Progressing  Goal: Glucose maintained within target range  Description  INTERVENTIONS:  - Monitor Blood Glucose as ordered  - Assess for signs and symptoms of hyperglycemia and hypoglycemia  - Administer ordered medications to maintain glucose within target range  - Assess nutritional intake and initiate nutrition service referral as needed  Outcome: Progressing     Problem: SKIN/TISSUE INTEGRITY - ADULT  Goal: Skin integrity remains intact  Description  INTERVENTIONS  - Identify patients at risk for skin breakdown  - Assess and monitor skin integrity  - Assess and monitor nutrition and hydration status  - Monitor labs (i e  albumin)  - Assess for incontinence   - Turn and reposition patient  - Assist with mobility/ambulation  - Relieve pressure over bony prominences  - Avoid friction and shearing  - Provide appropriate hygiene as needed including keeping skin clean and dry  - Evaluate need for skin moisturizer/barrier cream  - Collaborate with interdisciplinary team (i e  Nutrition, Rehabilitation, etc )   - Patient/family teaching  Outcome: Progressing  Goal: Incision(s), wounds(s) or drain site(s) healing without S/S of infection  Description  INTERVENTIONS  - Assess and document risk factors for skin impairment   - Assess and document dressing, incision, wound bed, drain sites and surrounding tissue  - Initiate Nutrition services consult and/or wound management as needed  Outcome: Progressing  Goal: Oral mucous membranes remain intact  Description  INTERVENTIONS  - Assess oral mucosa and hygiene practices  - Implement preventative oral hygiene regimen  - Implement oral medicated treatments as ordered  - Initiate Nutrition services referral as needed  Outcome: Progressing     Problem: HEMATOLOGIC - ADULT  Goal: Maintains hematologic stability  Description  INTERVENTIONS  - Assess for signs and symptoms of bleeding or hemorrhage  - Monitor labs  - Administer supportive blood products/factors as ordered and appropriate  Outcome: Progressing     Problem: MUSCULOSKELETAL - ADULT  Goal: Maintain or return mobility to safest level of function  Description  INTERVENTIONS:  - Assess patient's ability to carry out ADLs; assess patient's baseline for ADL function and identify physical deficits which impact ability to perform ADLs (bathing, care of mouth/teeth, toileting, grooming, dressing, etc )  - Assess/evaluate cause of self-care deficits   - Assess range of motion  - Assess patient's mobility; develop plan if impaired  - Assess patient's need for assistive devices and provide as appropriate  - Encourage maximum independence but intervene and supervise when necessary  - Involve family in performance of ADLs  - Assess for home care needs following discharge   - Request OT consult to assist with ADL evaluation and planning for discharge  - Provide patient education as appropriate  Outcome: Progressing  Goal: Maintain proper alignment of affected body part  Description  INTERVENTIONS:  - Support, maintain and protect limb and body alignment  - Provide pt/fam with appropriate education  Outcome: Progressing

## 2019-08-06 NOTE — ASSESSMENT & PLAN NOTE
- Left lower extremity laceration status post debridement, washout, and VAC dressing placement on 08/01/2019   - Continue local wound care with VAC dressing to the left lower extremity wounds with tentative plan to attempt wound closure later this week  - Continue multimodal analgesic regimen as needed  - Tetanus updated

## 2019-08-06 NOTE — ASSESSMENT & PLAN NOTE
- Pulmonary contusion status post colon chest trauma with associated left-sided rib fractures  - Continue management of rib fractures  - Continue to encourage incentive spirometer use and adequate pulmonary hygiene  - Continue to encourage activity and patient being out of bed

## 2019-08-06 NOTE — PHYSICAL THERAPY NOTE
Physical Therapy Progress Note        Tez North, PTA       08/06/19 1598   Pain Assessment   Pain Assessment 0-10   Pain Score 7   Pain Type Acute pain   Pain Location Leg;Hip;Rib cage   Pain Orientation Left   Negative Pressure Wound Therapy (V A C )   Placement Date/Time: 08/01/19 2205   Inserted by: Annette Dudley  Wound Type: Acute/traumatic   Unit Type wound vac    Restrictions/Precautions   Weight Bearing Precautions Per Order Yes   RUE Weight Bearing Per Order WBAT   LLE Weight Bearing Per Order WBAT   Braces or Orthoses LE Braces  (HKB )   Other Precautions   (vac dressing )   Subjective   Subjective pt reports feeling okay just needs BM today , pt states been walking ,   Bed Mobility   Supine to Sit 5  Supervision   Sit to Supine 5  Supervision   Transfers   Sit to Stand 5  Supervision   Stand to Sit 5  Supervision   Stand pivot 5  Supervision   Additional Comments pt perform with RW and IV pole and vwound vac needs assistance    Ambulation/Elevation   Gait pattern Improper Weight shift;Narrow LEONOR; Decreased foot clearance   Gait Assistance 5  Supervision   Additional items Verbal cues   Assistive Device Rolling walker   Distance 300  (x2)   Stair Management Assistance 4  Minimal assist   Additional items Verbal cues   Stair Management Technique One rail R;One rail L;Nonreciprocal   Number of Stairs 6  (limited by IVpole )   Balance   Static Sitting Fair +   Dynamic Sitting Fair +   Static Standing Fair   Dynamic Standing Fair -   Ambulatory Poor +   Activity Tolerance   Activity Tolerance Patient limited by fatigue   Exercises   Balance Training Standing   Squat   (Sit to standing x10 )   Assessment   Prognosis Good   Problem List Decreased range of motion;Decreased endurance;Pain;Orthopedic restrictions   Assessment pt progressing today with S level ambulation with RW , assist/with IV pole and wound vac as above    Pt also inc gait distance with RW vc's for pattern and pt shows dec cosmetic gait pattern , Pt perform ascending and descending  steps 4 inch descending BWD as above , pt limited with iv pole to advance , pt has 15STE home , needs more skilled PT to meet goals   pt sitting EOB with all needs in reach and nsging aware  Goals   Patient Goals to go home and back to work    STG Expiration Date 08/16/19   Plan   Treatment/Interventions Gait training;Bed mobility; Patient/family training; Endurance training; Therapeutic exercise;Elevations; Functional transfer training   Progress Progressing toward goals   Recommendation   Recommendation Home with family support; Outpatient PT   Equipment Recommended Walker   Additional Comments d/c when medical ready and pt needs FF 15 step before D/C

## 2019-08-06 NOTE — PROGRESS NOTES
Acute Pain Service/Anesthesiology:    Chart reviewed and case discussed with trauma PA  No changes to patient condition and no new changes to medication regimen  Recommendations from yesterday unchanged  Limit IV opioid utilization and encourage oral medication use wherever possible  Continue ketamine at current dose, will wean prior to discharge      Guru Guardado MD  August 6, 2019  10:09 AM

## 2019-08-06 NOTE — ASSESSMENT & PLAN NOTE
- Multiple left-sided rib fractures (anterior-lateral 4-7)  - Continue Rib fracture protocol   - Continue multimodal analgesic regimen  Appreciate assistance of the Acute Pain Service  - Continue to encourage incentive spirometer use and adequate pulmonary hygiene  - Continue PT and OT evaluation and treatment as indicated

## 2019-08-06 NOTE — PLAN OF CARE
Problem: PHYSICAL THERAPY ADULT  Goal: Performs mobility at highest level of function for planned discharge setting  See evaluation for individualized goals  Description  Treatment/Interventions: (P) Functional transfer training, LE strengthening/ROM, Therapeutic exercise, Endurance training, Bed mobility, Gait training, Spoke to nursing, OT          See flowsheet documentation for full assessment, interventions and recommendations  Note:   Prognosis: Good  Problem List: Decreased range of motion, Decreased endurance, Pain, Orthopedic restrictions  Assessment: pt progressing today with S level ambulation with RW , assist/with IV pole and wound vac as above   Pt also inc gait distance with RW vc's for pattern and pt shows dec cosmetic gait pattern , Pt perform ascending and descending  steps 4 inch descending BWD as above , pt limited with iv pole to advance , pt has 15STE home , needs more skilled PT to meet goals   pt sitting EOB with all needs in reach and nsging aware  Barriers to Discharge: (P) Inaccessible home environment     Recommendation: Home with family support, Outpatient PT     PT - OK to Discharge: (S) (P) Yes(PMR consult)    See flowsheet documentation for full assessment

## 2019-08-06 NOTE — ASSESSMENT & PLAN NOTE
- Grade 5 splenic laceration status post IR embolization on 8/1/2019   - Diet as tolerated  - Continue to monitor abdominal exam   - Continue multimodal analgesic regimen as needed  - Monitor for any additional blood loss  Patient had previously received 2 units of packed red blood cells earlier this admission

## 2019-08-06 NOTE — ASSESSMENT & PLAN NOTE
- Acute nondisplaced left anterior acetabular wall fracture with associated vertical nondisplaced fracture through the left ilium adjacent to the sacroiliac joint with mild widening of the sacroiliac joint  - Appreciate orthopedic surgery evaluation and recommendations  Non operative management recommended  - Patient okay for weight-bearing as tolerated on the left lower extremity in regards to this injury per Orthopedic surgery  - Continue multimodal analgesic regimen as needed  - Continue PT and OT evaluation and treatment as indicated  - Outpatient follow-up with Orthopedic surgery for re-evaluation

## 2019-08-06 NOTE — PLAN OF CARE
Problem: Prexisting or High Potential for Compromised Skin Integrity  Goal: Skin integrity is maintained or improved  Description  INTERVENTIONS:  - Identify patients at risk for skin breakdown  - Assess and monitor skin integrity  - Assess and monitor nutrition and hydration status  - Monitor labs (i e  albumin)  - Assess for incontinence   - Turn and reposition patient  - Assist with mobility/ambulation  - Relieve pressure over bony prominences  - Avoid friction and shearing  - Provide appropriate hygiene as needed including keeping skin clean and dry  - Evaluate need for skin moisturizer/barrier cream  - Collaborate with interdisciplinary team (i e  Nutrition, Rehabilitation, etc )   - Patient/family teaching  Outcome: Progressing     Problem: Potential for Falls  Goal: Patient will remain free of falls  Description  INTERVENTIONS:  - Assess patient frequently for physical needs  -  Identify cognitive and physical deficits and behaviors that affect risk of falls    -  [place] fall precautions as indicated by assessment   - Educate patient/family on patient safety including physical limitations  - Instruct patient to call for assistance with activity based on assessment  - Modify environment to reduce risk of injury  - Consider OT/PT consult to assist with strengthening/mobility  Outcome: Progressing     Problem: PAIN - ADULT  Goal: Verbalizes/displays adequate comfort level or baseline comfort level  Description  Interventions:  - Encourage patient to monitor pain and request assistance  - Assess pain using appropriate pain scale  - Administer analgesics based on type and severity of pain and evaluate response  - Implement non-pharmacological measures as appropriate and evaluate response  - Consider cultural and social influences on pain and pain management  - Notify physician/advanced practitioner if interventions unsuccessful or patient reports new pain  Outcome: Progressing     Problem: INFECTION - ADULT  Goal: Absence or prevention of progression during hospitalization  Description  INTERVENTIONS:  - Assess and monitor for signs and symptoms of infection  - Monitor lab/diagnostic results  - Monitor all insertion sites, i e  indwelling lines, tubes, and drains  - Monitor endotracheal (as able) and nasal secretions for changes in amount and color  - [place] appropriate cooling/warming therapies per order  - Administer medications as ordered  - Instruct and encourage patient and family to use good hand hygiene technique  - Identify and instruct in appropriate isolation precautions for identified infection/condition  Outcome: Progressing  Goal: Absence of fever/infection during neutropenic period  Description  INTERVENTIONS:  - Monitor WBC  - Implement neutropenic guidelines  Outcome: Progressing     Problem: SAFETY ADULT  Goal: Maintain or return to baseline ADL function  Description  INTERVENTIONS:  -  Assess patient's ability to carry out ADLs; assess patient's baseline for ADL function and identify physical deficits which impact ability to perform ADLs (bathing, care of mouth/teeth, toileting, grooming, dressing, etc )  - Assess/evaluate cause of self-care deficits   - Assess range of motion  - Assess patient's mobility; develop plan if impaired  - Assess patient's need for assistive devices and provide as appropriate  - Encourage maximum independence but intervene and supervise when necessary  ¯ Involve family in performance of ADLs  ¯ Assess for home care needs following discharge   ¯ Request OT consult to assist with ADL evaluation and planning for discharge  ¯ Provide patient education as appropriate  Outcome: Progressing  Goal: Maintain or return mobility status to optimal level  Description  INTERVENTIONS:  - Assess patient's baseline mobility status (ambulation, transfers, stairs, etc )    - Identify cognitive and physical deficits and behaviors that affect mobility  - Identify mobility aids required to assist with transfers and/or ambulation (gait belt, sit-to-stand, lift, walker, cane, etc )  - [place] fall precautions as indicated by assessment  - Record patient progress and toleration of activity level on Mobility SBAR; progress patient to next Phase/Stage  - Instruct patient to call for assistance with activity based on assessment  - Request Rehabilitation consult to assist with strengthening/weightbearing, etc   Outcome: Progressing     Problem: DISCHARGE PLANNING  Goal: Discharge to home or other facility with appropriate resources  Description  INTERVENTIONS:  - Identify barriers to discharge w/patient and caregiver  - Arrange for needed discharge resources and transportation as appropriate  - Identify discharge learning needs (meds, wound care, etc )  - Arrange for interpretive services to assist at discharge as needed  - Refer to Case Management Department for coordinating discharge planning if the patient needs post-hospital services based on physician/advanced practitioner order or complex needs related to functional status, cognitive ability, or social support system  Outcome: Progressing     Problem: Knowledge Deficit  Goal: Patient/family/caregiver demonstrates understanding of disease process, treatment plan, medications, and discharge instructions  Description  Complete learning assessment and assess knowledge base  Interventions:  - Provide teaching at level of understanding  - Provide teaching via preferred learning methods  Outcome: Progressing     Problem: NEUROSENSORY - ADULT  Goal: Achieves stable or improved neurological status  Description  INTERVENTIONS  - Monitor and report changes in neurological status  - Initiate measures to prevent increased intracranial pressure  - Maintain blood pressure and fluid volume within ordered parameters to optimize cerebral perfusion  - Monitor temperature, glucose, and sodium or any other associated labs   Initiate appropriate interventions as ordered  - Monitor for seizure activity   - Administer anti-seizure medications as ordered  Outcome: Progressing     Problem: CARDIOVASCULAR - ADULT  Goal: Maintains optimal cardiac output and hemodynamic stability  Description  INTERVENTIONS:  - Monitor I/O, vital signs and rhythm  - Monitor for S/S and trends of decreased cardiac output i e  bleeding, hypotension  - Administer and titrate ordered vasoactive medications to optimize hemodynamic stability  - Assess quality of pulses, skin color and temperature  - Assess for signs of decreased coronary artery perfusion - ex   Angina  - Instruct patient to report change in severity of symptoms  Outcome: Progressing  Goal: Absence of cardiac dysrhythmias or at baseline rhythm  Description  INTERVENTIONS:  - Continuous cardiac monitoring, monitor vital signs, obtain 12 lead EKG if indicated  - Administer antiarrhythmic and heart rate control medications as ordered  - Monitor electrolytes and administer replacement therapy as ordered  Outcome: Progressing     Problem: RESPIRATORY - ADULT  Goal: Achieves optimal ventilation and oxygenation  Description  INTERVENTIONS:  - Assess for changes in respiratory status  - Assess for changes in mentation and behavior  - Position to facilitate oxygenation and minimize respiratory effort  - Oxygen administration by appropriate delivery method based on oxygen saturation (per order) or ABGs  - Initiate smoking cessation education as indicated  - Encourage broncho-pulmonary hygiene including cough, deep breathe, Incentive Spirometry  - Assess the need for suctioning and aspirate as needed  - Assess and instruct to report SOB or any respiratory difficulty  - Respiratory Therapy support as indicated  Outcome: Progressing     Problem: GASTROINTESTINAL - ADULT  Goal: Minimal or absence of nausea and/or vomiting  Description  INTERVENTIONS:  - Administer IV fluids as ordered to ensure adequate hydration  - Maintain NPO status until nausea and vomiting are resolved  - Nasogastric tube as ordered  - Administer ordered antiemetic medications as needed  - Provide nonpharmacologic comfort measures as appropriate  - Advance diet as tolerated, if ordered  - Nutrition services referral to assist patient with adequate nutrition and appropriate food choices  Outcome: Progressing  Goal: Maintains or returns to baseline bowel function  Description  INTERVENTIONS:  - Assess bowel function  - Encourage oral fluids to ensure adequate hydration  - Administer IV fluids as ordered to ensure adequate hydration  - Administer ordered medications as needed  - Encourage mobilization and activity  - Nutrition services referral to assist patient with appropriate food choices  Outcome: Progressing  Goal: Maintains adequate nutritional intake  Description  INTERVENTIONS:  - Monitor percentage of each meal consumed  - Identify factors contributing to decreased intake, treat as appropriate  - Assist with meals as needed  - Monitor I&O, WT and lab values  - Obtain nutrition services referral as needed  Outcome: Progressing     Problem: GENITOURINARY - ADULT  Goal: Maintains or returns to baseline urinary function  Description  INTERVENTIONS:  - Assess urinary function  - Encourage oral fluids to ensure adequate hydration  - Administer IV fluids as ordered to ensure adequate hydration  - Administer ordered medications as needed  - Offer frequent toileting  - Follow urinary retention protocol if ordered  Outcome: Progressing  Goal: Absence of urinary retention  Description  INTERVENTIONS:  - Assess patients ability to void and empty bladder  - Monitor I/O  - Bladder scan as needed  - Discuss with physician/AP medications to alleviate retention as needed  - Discuss catheterization for long term situations as appropriate  Outcome: Progressing     Problem: METABOLIC, FLUID AND ELECTROLYTES - ADULT  Goal: Electrolytes maintained within normal limits  Description  INTERVENTIONS:  - Monitor labs and assess patient for signs and symptoms of electrolyte imbalances  - Administer electrolyte replacement as ordered  - Monitor response to electrolyte replacements, including repeat lab results as appropriate  - Instruct patient on fluid and nutrition as appropriate  Outcome: Progressing  Goal: Fluid balance maintained  Description  INTERVENTIONS:  - Monitor labs and assess for signs and symptoms of volume excess or deficit  - Monitor I/O and WT  - Instruct patient on fluid and nutrition as appropriate  Outcome: Progressing  Goal: Glucose maintained within target range  Description  INTERVENTIONS:  - Monitor Blood Glucose as ordered  - Assess for signs and symptoms of hyperglycemia and hypoglycemia  - Administer ordered medications to maintain glucose within target range  - Assess nutritional intake and initiate nutrition service referral as needed  Outcome: Progressing     Problem: SKIN/TISSUE INTEGRITY - ADULT  Goal: Skin integrity remains intact  Description  INTERVENTIONS  - Identify patients at risk for skin breakdown  - Assess and monitor skin integrity  - Assess and monitor nutrition and hydration status  - Monitor labs (i e  albumin)  - Assess for incontinence   - Turn and reposition patient  - Assist with mobility/ambulation  - Relieve pressure over bony prominences  - Avoid friction and shearing  - Provide appropriate hygiene as needed including keeping skin clean and dry  - Evaluate need for skin moisturizer/barrier cream  - Collaborate with interdisciplinary team (i e  Nutrition, Rehabilitation, etc )   - Patient/family teaching  Outcome: Progressing  Goal: Incision(s), wounds(s) or drain site(s) healing without S/S of infection  Description  INTERVENTIONS  - Assess and document risk factors for skin impairment   - Assess and document dressing, incision, wound bed, drain sites and surrounding tissue  - Initiate Nutrition services consult and/or wound management as needed  Outcome: Progressing  Goal: Oral mucous membranes remain intact  Description  INTERVENTIONS  - Assess oral mucosa and hygiene practices  - Implement preventative oral hygiene regimen  - Implement oral medicated treatments as ordered  - Initiate Nutrition services referral as needed  Outcome: Progressing     Problem: HEMATOLOGIC - ADULT  Goal: Maintains hematologic stability  Description  INTERVENTIONS  - Assess for signs and symptoms of bleeding or hemorrhage  - Monitor labs  - Administer supportive blood products/factors as ordered and appropriate  Outcome: Progressing     Problem: MUSCULOSKELETAL - ADULT  Goal: Maintain or return mobility to safest level of function  Description  INTERVENTIONS:  - Assess patient's ability to carry out ADLs; assess patient's baseline for ADL function and identify physical deficits which impact ability to perform ADLs (bathing, care of mouth/teeth, toileting, grooming, dressing, etc )  - Assess/evaluate cause of self-care deficits   - Assess range of motion  - Assess patient's mobility; develop plan if impaired  - Assess patient's need for assistive devices and provide as appropriate  - Encourage maximum independence but intervene and supervise when necessary  - Involve family in performance of ADLs  - Assess for home care needs following discharge   - Request OT consult to assist with ADL evaluation and planning for discharge  - Provide patient education as appropriate  Outcome: Progressing  Goal: Maintain proper alignment of affected body part  Description  INTERVENTIONS:  - Support, maintain and protect limb and body alignment  - Provide pt/fam with appropriate education  Outcome: Progressing
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
## 2019-08-06 NOTE — QUICK NOTE
Wound vac was changed per standard protocol  Time out was performed  1 black sponge was removed, and replaced w 1 black sponge  Wound vac on 125 continuous suction  The wound was clean and dry w nice granulation tissue  There were no complications  Pt tolerated the procedure well  Vac should be changed again on Thursday 8/8/19      Timur Samaniego, PGY1  General Surgery  8/6/2019

## 2019-08-07 LAB
ANION GAP SERPL CALCULATED.3IONS-SCNC: 4 MMOL/L (ref 4–13)
BASOPHILS # BLD AUTO: 0.02 THOUSANDS/ΜL (ref 0–0.1)
BASOPHILS NFR BLD AUTO: 0 % (ref 0–1)
BUN SERPL-MCNC: 11 MG/DL (ref 5–25)
CALCIUM SERPL-MCNC: 8.2 MG/DL (ref 8.3–10.1)
CHLORIDE SERPL-SCNC: 106 MMOL/L (ref 100–108)
CO2 SERPL-SCNC: 27 MMOL/L (ref 21–32)
CREAT SERPL-MCNC: 0.69 MG/DL (ref 0.6–1.3)
EOSINOPHIL # BLD AUTO: 0.1 THOUSAND/ΜL (ref 0–0.61)
EOSINOPHIL NFR BLD AUTO: 1 % (ref 0–6)
ERYTHROCYTE [DISTWIDTH] IN BLOOD BY AUTOMATED COUNT: 13.1 % (ref 11.6–15.1)
GFR SERPL CREATININE-BSD FRML MDRD: 126 ML/MIN/1.73SQ M
GLUCOSE SERPL-MCNC: 181 MG/DL (ref 65–140)
HCT VFR BLD AUTO: 30.8 % (ref 36.5–49.3)
HGB BLD-MCNC: 10.6 G/DL (ref 12–17)
IMM GRANULOCYTES # BLD AUTO: 0.05 THOUSAND/UL (ref 0–0.2)
IMM GRANULOCYTES NFR BLD AUTO: 1 % (ref 0–2)
LYMPHOCYTES # BLD AUTO: 0.96 THOUSANDS/ΜL (ref 0.6–4.47)
LYMPHOCYTES NFR BLD AUTO: 11 % (ref 14–44)
MCH RBC QN AUTO: 30.9 PG (ref 26.8–34.3)
MCHC RBC AUTO-ENTMCNC: 34.4 G/DL (ref 31.4–37.4)
MCV RBC AUTO: 90 FL (ref 82–98)
MONOCYTES # BLD AUTO: 0.89 THOUSAND/ΜL (ref 0.17–1.22)
MONOCYTES NFR BLD AUTO: 10 % (ref 4–12)
NEUTROPHILS # BLD AUTO: 7.11 THOUSANDS/ΜL (ref 1.85–7.62)
NEUTS SEG NFR BLD AUTO: 77 % (ref 43–75)
NRBC BLD AUTO-RTO: 0 /100 WBCS
PLATELET # BLD AUTO: 202 THOUSANDS/UL (ref 149–390)
PMV BLD AUTO: 8.9 FL (ref 8.9–12.7)
POTASSIUM SERPL-SCNC: 4.3 MMOL/L (ref 3.5–5.3)
RBC # BLD AUTO: 3.43 MILLION/UL (ref 3.88–5.62)
SODIUM SERPL-SCNC: 137 MMOL/L (ref 136–145)
WBC # BLD AUTO: 9.13 THOUSAND/UL (ref 4.31–10.16)

## 2019-08-07 PROCEDURE — 99232 SBSQ HOSP IP/OBS MODERATE 35: CPT | Performed by: PHYSICIAN ASSISTANT

## 2019-08-07 PROCEDURE — 80048 BASIC METABOLIC PNL TOTAL CA: CPT | Performed by: PHYSICIAN ASSISTANT

## 2019-08-07 PROCEDURE — 85025 COMPLETE CBC W/AUTO DIFF WBC: CPT | Performed by: PHYSICIAN ASSISTANT

## 2019-08-07 RX ORDER — SODIUM CHLORIDE 9 MG/ML
100 INJECTION, SOLUTION INTRAVENOUS CONTINUOUS
Status: DISCONTINUED | OUTPATIENT
Start: 2019-08-08 | End: 2019-08-11 | Stop reason: HOSPADM

## 2019-08-07 RX ORDER — CEFAZOLIN SODIUM 2 G/50ML
2000 SOLUTION INTRAVENOUS
Status: DISCONTINUED | OUTPATIENT
Start: 2019-08-08 | End: 2019-08-08 | Stop reason: HOSPADM

## 2019-08-07 RX ADMIN — HEPARIN SODIUM 5000 UNITS: 5000 INJECTION INTRAVENOUS; SUBCUTANEOUS at 21:28

## 2019-08-07 RX ADMIN — HYDROMORPHONE HYDROCHLORIDE 1 MG: 1 INJECTION, SOLUTION INTRAMUSCULAR; INTRAVENOUS; SUBCUTANEOUS at 21:28

## 2019-08-07 RX ADMIN — METHOCARBAMOL 750 MG: 750 TABLET, FILM COATED ORAL at 06:45

## 2019-08-07 RX ADMIN — METHOCARBAMOL 750 MG: 750 TABLET, FILM COATED ORAL at 23:18

## 2019-08-07 RX ADMIN — ACETAMINOPHEN 975 MG: 325 TABLET ORAL at 13:29

## 2019-08-07 RX ADMIN — OXYCODONE HYDROCHLORIDE 20 MG: 10 TABLET ORAL at 10:56

## 2019-08-07 RX ADMIN — ACETAMINOPHEN 975 MG: 325 TABLET ORAL at 21:27

## 2019-08-07 RX ADMIN — ACETAMINOPHEN 975 MG: 325 TABLET ORAL at 06:45

## 2019-08-07 RX ADMIN — SENNOSIDES 17.2 MG: 8.6 TABLET, FILM COATED ORAL at 21:28

## 2019-08-07 RX ADMIN — QUETIAPINE FUMARATE 50 MG: 25 TABLET ORAL at 21:28

## 2019-08-07 RX ADMIN — DOCUSATE SODIUM 100 MG: 100 CAPSULE, LIQUID FILLED ORAL at 17:26

## 2019-08-07 RX ADMIN — HYDROMORPHONE HYDROCHLORIDE 1 MG: 1 INJECTION, SOLUTION INTRAMUSCULAR; INTRAVENOUS; SUBCUTANEOUS at 13:31

## 2019-08-07 RX ADMIN — OXYCODONE HYDROCHLORIDE 20 MG: 10 TABLET ORAL at 19:53

## 2019-08-07 RX ADMIN — OXYCODONE HYDROCHLORIDE 20 MG: 10 TABLET ORAL at 06:45

## 2019-08-07 RX ADMIN — METHOCARBAMOL 750 MG: 750 TABLET, FILM COATED ORAL at 01:11

## 2019-08-07 RX ADMIN — SODIUM CHLORIDE 100 ML/HR: 0.9 INJECTION, SOLUTION INTRAVENOUS at 23:20

## 2019-08-07 RX ADMIN — METHOCARBAMOL 750 MG: 750 TABLET, FILM COATED ORAL at 17:26

## 2019-08-07 RX ADMIN — SERTRALINE HYDROCHLORIDE 50 MG: 50 TABLET ORAL at 09:28

## 2019-08-07 RX ADMIN — Medication 0.3 MG/KG/HR: at 08:16

## 2019-08-07 RX ADMIN — HEPARIN SODIUM 5000 UNITS: 5000 INJECTION INTRAVENOUS; SUBCUTANEOUS at 06:45

## 2019-08-07 RX ADMIN — NICOTINE 14 MG: 14 PATCH TRANSDERMAL at 08:16

## 2019-08-07 RX ADMIN — HYDROMORPHONE HYDROCHLORIDE 1 MG: 1 INJECTION, SOLUTION INTRAMUSCULAR; INTRAVENOUS; SUBCUTANEOUS at 17:26

## 2019-08-07 RX ADMIN — HEPARIN SODIUM 5000 UNITS: 5000 INJECTION INTRAVENOUS; SUBCUTANEOUS at 13:29

## 2019-08-07 RX ADMIN — METHOCARBAMOL 750 MG: 750 TABLET, FILM COATED ORAL at 11:19

## 2019-08-07 RX ADMIN — DOCUSATE SODIUM 100 MG: 100 CAPSULE, LIQUID FILLED ORAL at 08:16

## 2019-08-07 RX ADMIN — OXYCODONE HYDROCHLORIDE 20 MG: 10 TABLET ORAL at 15:18

## 2019-08-07 RX ADMIN — HYDROMORPHONE HYDROCHLORIDE 1 MG: 1 INJECTION, SOLUTION INTRAMUSCULAR; INTRAVENOUS; SUBCUTANEOUS at 08:16

## 2019-08-07 RX ADMIN — Medication 0.3 MG/KG/HR: at 19:54

## 2019-08-07 RX ADMIN — MELATONIN 6 MG: 3 TAB ORAL at 21:28
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
## 2019-08-07 NOTE — PLAN OF CARE
Problem: Prexisting or High Potential for Compromised Skin Integrity  Goal: Skin integrity is maintained or improved  Description  INTERVENTIONS:  - Identify patients at risk for skin breakdown  - Assess and monitor skin integrity  - Assess and monitor nutrition and hydration status  - Monitor labs (i e  albumin)  - Assess for incontinence   - Turn and reposition patient  - Assist with mobility/ambulation  - Relieve pressure over bony prominences  - Avoid friction and shearing  - Provide appropriate hygiene as needed including keeping skin clean and dry  - Evaluate need for skin moisturizer/barrier cream  - Collaborate with interdisciplinary team (i e  Nutrition, Rehabilitation, etc )   - Patient/family teaching  Outcome: Progressing     Problem: Potential for Falls  Goal: Patient will remain free of falls  Description  INTERVENTIONS:  - Assess patient frequently for physical needs  -  Identify cognitive and physical deficits and behaviors that affect risk of falls    -  [place] fall precautions as indicated by assessment   - Educate patient/family on patient safety including physical limitations  - Instruct patient to call for assistance with activity based on assessment  - Modify environment to reduce risk of injury  - Consider OT/PT consult to assist with strengthening/mobility  Outcome: Progressing     Problem: PAIN - ADULT  Goal: Verbalizes/displays adequate comfort level or baseline comfort level  Description  Interventions:  - Encourage patient to monitor pain and request assistance  - Assess pain using appropriate pain scale  - Administer analgesics based on type and severity of pain and evaluate response  - Implement non-pharmacological measures as appropriate and evaluate response  - Consider cultural and social influences on pain and pain management  - Notify physician/advanced practitioner if interventions unsuccessful or patient reports new pain  Outcome: Progressing     Problem: INFECTION - ADULT  Goal: Absence or prevention of progression during hospitalization  Description  INTERVENTIONS:  - Assess and monitor for signs and symptoms of infection  - Monitor lab/diagnostic results  - Monitor all insertion sites, i e  indwelling lines, tubes, and drains  - Monitor endotracheal (as able) and nasal secretions for changes in amount and color  - Joice appropriate cooling/warming therapies per order  - Administer medications as ordered  - Instruct and encourage patient and family to use good hand hygiene technique  - Identify and instruct in appropriate isolation precautions for identified infection/condition  Outcome: Progressing  Goal: Absence of fever/infection during neutropenic period  Description  INTERVENTIONS:  - Monitor WBC  - Implement neutropenic guidelines  Outcome: Progressing     Problem: SAFETY ADULT  Goal: Maintain or return to baseline ADL function  Description  INTERVENTIONS:  -  Assess patient's ability to carry out ADLs; assess patient's baseline for ADL function and identify physical deficits which impact ability to perform ADLs (bathing, care of mouth/teeth, toileting, grooming, dressing, etc )  - Assess/evaluate cause of self-care deficits   - Assess range of motion  - Assess patient's mobility; develop plan if impaired  - Assess patient's need for assistive devices and provide as appropriate  - Encourage maximum independence but intervene and supervise when necessary  ¯ Involve family in performance of ADLs  ¯ Assess for home care needs following discharge   ¯ Request OT consult to assist with ADL evaluation and planning for discharge  ¯ Provide patient education as appropriate  Outcome: Progressing  Goal: Maintain or return mobility status to optimal level  Description  INTERVENTIONS:  - Assess patient's baseline mobility status (ambulation, transfers, stairs, etc )    - Identify cognitive and physical deficits and behaviors that affect mobility  - Identify mobility aids required to assist with transfers and/or ambulation (gait belt, sit-to-stand, lift, walker, cane, etc )  - [place] fall precautions as indicated by assessment  - Record patient progress and toleration of activity level on Mobility SBAR; progress patient to next Phase/Stage  - Instruct patient to call for assistance with activity based on assessment  - Request Rehabilitation consult to assist with strengthening/weightbearing, etc   Outcome: Progressing     Problem: DISCHARGE PLANNING  Goal: Discharge to home or other facility with appropriate resources  Description  INTERVENTIONS:  - Identify barriers to discharge w/patient and caregiver  - Arrange for needed discharge resources and transportation as appropriate  - Identify discharge learning needs (meds, wound care, etc )  - Arrange for interpretive services to assist at discharge as needed  - Refer to Case Management Department for coordinating discharge planning if the patient needs post-hospital services based on physician/advanced practitioner order or complex needs related to functional status, cognitive ability, or social support system  Outcome: Progressing     Problem: Knowledge Deficit  Goal: Patient/family/caregiver demonstrates understanding of disease process, treatment plan, medications, and discharge instructions  Description  Complete learning assessment and assess knowledge base  Interventions:  - Provide teaching at level of understanding  - Provide teaching via preferred learning methods  Outcome: Progressing     Problem: NEUROSENSORY - ADULT  Goal: Achieves stable or improved neurological status  Description  INTERVENTIONS  - Monitor and report changes in neurological status  - Initiate measures to prevent increased intracranial pressure  - Maintain blood pressure and fluid volume within ordered parameters to optimize cerebral perfusion  - Monitor temperature, glucose, and sodium or any other associated labs   Initiate appropriate interventions as ordered  - Monitor for seizure activity   - Administer anti-seizure medications as ordered  Outcome: Progressing     Problem: CARDIOVASCULAR - ADULT  Goal: Maintains optimal cardiac output and hemodynamic stability  Description  INTERVENTIONS:  - Monitor I/O, vital signs and rhythm  - Monitor for S/S and trends of decreased cardiac output i e  bleeding, hypotension  - Administer and titrate ordered vasoactive medications to optimize hemodynamic stability  - Assess quality of pulses, skin color and temperature  - Assess for signs of decreased coronary artery perfusion - ex   Angina  - Instruct patient to report change in severity of symptoms  Outcome: Progressing  Goal: Absence of cardiac dysrhythmias or at baseline rhythm  Description  INTERVENTIONS:  - Continuous cardiac monitoring, monitor vital signs, obtain 12 lead EKG if indicated  - Administer antiarrhythmic and heart rate control medications as ordered  - Monitor electrolytes and administer replacement therapy as ordered  Outcome: Progressing     Problem: RESPIRATORY - ADULT  Goal: Achieves optimal ventilation and oxygenation  Description  INTERVENTIONS:  - Assess for changes in respiratory status  - Assess for changes in mentation and behavior  - Position to facilitate oxygenation and minimize respiratory effort  - Oxygen administration by appropriate delivery method based on oxygen saturation (per order) or ABGs  - Initiate smoking cessation education as indicated  - Encourage broncho-pulmonary hygiene including cough, deep breathe, Incentive Spirometry  - Assess the need for suctioning and aspirate as needed  - Assess and instruct to report SOB or any respiratory difficulty  - Respiratory Therapy support as indicated  Outcome: Progressing     Problem: GASTROINTESTINAL - ADULT  Goal: Minimal or absence of nausea and/or vomiting  Description  INTERVENTIONS:  - Administer IV fluids as ordered to ensure adequate hydration  - Maintain NPO status until nausea and vomiting are resolved  - Nasogastric tube as ordered  - Administer ordered antiemetic medications as needed  - Provide nonpharmacologic comfort measures as appropriate  - Advance diet as tolerated, if ordered  - Nutrition services referral to assist patient with adequate nutrition and appropriate food choices  Outcome: Progressing  Goal: Maintains or returns to baseline bowel function  Description  INTERVENTIONS:  - Assess bowel function  - Encourage oral fluids to ensure adequate hydration  - Administer IV fluids as ordered to ensure adequate hydration  - Administer ordered medications as needed  - Encourage mobilization and activity  - Nutrition services referral to assist patient with appropriate food choices  Outcome: Progressing  Goal: Maintains adequate nutritional intake  Description  INTERVENTIONS:  - Monitor percentage of each meal consumed  - Identify factors contributing to decreased intake, treat as appropriate  - Assist with meals as needed  - Monitor I&O, WT and lab values  - Obtain nutrition services referral as needed  Outcome: Progressing     Problem: GENITOURINARY - ADULT  Goal: Maintains or returns to baseline urinary function  Description  INTERVENTIONS:  - Assess urinary function  - Encourage oral fluids to ensure adequate hydration  - Administer IV fluids as ordered to ensure adequate hydration  - Administer ordered medications as needed  - Offer frequent toileting  - Follow urinary retention protocol if ordered  Outcome: Progressing  Goal: Absence of urinary retention  Description  INTERVENTIONS:  - Assess patients ability to void and empty bladder  - Monitor I/O  - Bladder scan as needed  - Discuss with physician/AP medications to alleviate retention as needed  - Discuss catheterization for long term situations as appropriate  Outcome: Progressing     Problem: METABOLIC, FLUID AND ELECTROLYTES - ADULT  Goal: Electrolytes maintained within normal limits  Description  INTERVENTIONS:  - Monitor labs and assess patient for signs and symptoms of electrolyte imbalances  - Administer electrolyte replacement as ordered  - Monitor response to electrolyte replacements, including repeat lab results as appropriate  - Instruct patient on fluid and nutrition as appropriate  Outcome: Progressing  Goal: Fluid balance maintained  Description  INTERVENTIONS:  - Monitor labs and assess for signs and symptoms of volume excess or deficit  - Monitor I/O and WT  - Instruct patient on fluid and nutrition as appropriate  Outcome: Progressing  Goal: Glucose maintained within target range  Description  INTERVENTIONS:  - Monitor Blood Glucose as ordered  - Assess for signs and symptoms of hyperglycemia and hypoglycemia  - Administer ordered medications to maintain glucose within target range  - Assess nutritional intake and initiate nutrition service referral as needed  Outcome: Progressing     Problem: SKIN/TISSUE INTEGRITY - ADULT  Goal: Skin integrity remains intact  Description  INTERVENTIONS  - Identify patients at risk for skin breakdown  - Assess and monitor skin integrity  - Assess and monitor nutrition and hydration status  - Monitor labs (i e  albumin)  - Assess for incontinence   - Turn and reposition patient  - Assist with mobility/ambulation  - Relieve pressure over bony prominences  - Avoid friction and shearing  - Provide appropriate hygiene as needed including keeping skin clean and dry  - Evaluate need for skin moisturizer/barrier cream  - Collaborate with interdisciplinary team (i e  Nutrition, Rehabilitation, etc )   - Patient/family teaching  Outcome: Progressing  Goal: Incision(s), wounds(s) or drain site(s) healing without S/S of infection  Description  INTERVENTIONS  - Assess and document risk factors for skin impairment   - Assess and document dressing, incision, wound bed, drain sites and surrounding tissue  - Initiate Nutrition services consult and/or wound management as needed  Outcome: Progressing  Goal: Oral mucous membranes remain intact  Description  INTERVENTIONS  - Assess oral mucosa and hygiene practices  - Implement preventative oral hygiene regimen  - Implement oral medicated treatments as ordered  - Initiate Nutrition services referral as needed  Outcome: Progressing     Problem: HEMATOLOGIC - ADULT  Goal: Maintains hematologic stability  Description  INTERVENTIONS  - Assess for signs and symptoms of bleeding or hemorrhage  - Monitor labs  - Administer supportive blood products/factors as ordered and appropriate  Outcome: Progressing     Problem: MUSCULOSKELETAL - ADULT  Goal: Maintain or return mobility to safest level of function  Description  INTERVENTIONS:  - Assess patient's ability to carry out ADLs; assess patient's baseline for ADL function and identify physical deficits which impact ability to perform ADLs (bathing, care of mouth/teeth, toileting, grooming, dressing, etc )  - Assess/evaluate cause of self-care deficits   - Assess range of motion  - Assess patient's mobility; develop plan if impaired  - Assess patient's need for assistive devices and provide as appropriate  - Encourage maximum independence but intervene and supervise when necessary  - Involve family in performance of ADLs  - Assess for home care needs following discharge   - Request OT consult to assist with ADL evaluation and planning for discharge  - Provide patient education as appropriate  Outcome: Progressing  Goal: Maintain proper alignment of affected body part  Description  INTERVENTIONS:  - Support, maintain and protect limb and body alignment  - Provide pt/fam with appropriate education  Outcome: Progressing
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
## 2019-08-07 NOTE — ASSESSMENT & PLAN NOTE
- Acute nondisplaced left anterior acetabular wall fracture with associated vertical nondisplaced fracture through the left ilium adjacent to the sacroiliac joint with mild widening of the sacroiliac joint   - Non-op, WBAT LLE
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
## 2019-08-07 NOTE — ASSESSMENT & PLAN NOTE
- Left-sided minimally displaced medial femoral condyle fracture    - Non-operative management in hinged knee brace, WBAT  - PT/OT  - pain management

## 2019-08-07 NOTE — ASSESSMENT & PLAN NOTE
- Multiple left-sided rib fractures (anterior-lateral 4-7)  - Continue Rib fracture protocol   - Continue multimodal analgesic regimen    Appreciate assistance of the Acute Pain Service   - IS, Pulmonary toilet

## 2019-08-08 ENCOUNTER — ANESTHESIA (INPATIENT)
Dept: PERIOP | Facility: HOSPITAL | Age: 29
DRG: 912 | End: 2019-08-08
Payer: COMMERCIAL

## 2019-08-08 ENCOUNTER — ANESTHESIA EVENT (INPATIENT)
Dept: PERIOP | Facility: HOSPITAL | Age: 29
DRG: 912 | End: 2019-08-08
Payer: COMMERCIAL

## 2019-08-08 LAB
ABO GROUP BLD: NORMAL
ANION GAP SERPL CALCULATED.3IONS-SCNC: 6 MMOL/L (ref 4–13)
BASOPHILS # BLD AUTO: 0.05 THOUSANDS/ΜL (ref 0–0.1)
BASOPHILS NFR BLD AUTO: 1 % (ref 0–1)
BLD GP AB SCN SERPL QL: NEGATIVE
BUN SERPL-MCNC: 12 MG/DL (ref 5–25)
CALCIUM SERPL-MCNC: 9.2 MG/DL (ref 8.3–10.1)
CHLORIDE SERPL-SCNC: 107 MMOL/L (ref 100–108)
CO2 SERPL-SCNC: 29 MMOL/L (ref 21–32)
CREAT SERPL-MCNC: 0.66 MG/DL (ref 0.6–1.3)
EOSINOPHIL # BLD AUTO: 0.23 THOUSAND/ΜL (ref 0–0.61)
EOSINOPHIL NFR BLD AUTO: 3 % (ref 0–6)
ERYTHROCYTE [DISTWIDTH] IN BLOOD BY AUTOMATED COUNT: 13.2 % (ref 11.6–15.1)
GFR SERPL CREATININE-BSD FRML MDRD: 129 ML/MIN/1.73SQ M
GLUCOSE SERPL-MCNC: 89 MG/DL (ref 65–140)
HCT VFR BLD AUTO: 33.8 % (ref 36.5–49.3)
HGB BLD-MCNC: 11.3 G/DL (ref 12–17)
IMM GRANULOCYTES # BLD AUTO: 0.07 THOUSAND/UL (ref 0–0.2)
IMM GRANULOCYTES NFR BLD AUTO: 1 % (ref 0–2)
LYMPHOCYTES # BLD AUTO: 1.46 THOUSANDS/ΜL (ref 0.6–4.47)
LYMPHOCYTES NFR BLD AUTO: 16 % (ref 14–44)
MCH RBC QN AUTO: 30.3 PG (ref 26.8–34.3)
MCHC RBC AUTO-ENTMCNC: 33.4 G/DL (ref 31.4–37.4)
MCV RBC AUTO: 91 FL (ref 82–98)
MONOCYTES # BLD AUTO: 1.15 THOUSAND/ΜL (ref 0.17–1.22)
MONOCYTES NFR BLD AUTO: 13 % (ref 4–12)
NEUTROPHILS # BLD AUTO: 6.22 THOUSANDS/ΜL (ref 1.85–7.62)
NEUTS SEG NFR BLD AUTO: 66 % (ref 43–75)
NRBC BLD AUTO-RTO: 0 /100 WBCS
PLATELET # BLD AUTO: 237 THOUSANDS/UL (ref 149–390)
PMV BLD AUTO: 8.8 FL (ref 8.9–12.7)
POTASSIUM SERPL-SCNC: 4 MMOL/L (ref 3.5–5.3)
RBC # BLD AUTO: 3.73 MILLION/UL (ref 3.88–5.62)
RH BLD: POSITIVE
SODIUM SERPL-SCNC: 142 MMOL/L (ref 136–145)
SPECIMEN EXPIRATION DATE: NORMAL
WBC # BLD AUTO: 9.18 THOUSAND/UL (ref 4.31–10.16)

## 2019-08-08 PROCEDURE — 86900 BLOOD TYPING SEROLOGIC ABO: CPT | Performed by: STUDENT IN AN ORGANIZED HEALTH CARE EDUCATION/TRAINING PROGRAM

## 2019-08-08 PROCEDURE — 99232 SBSQ HOSP IP/OBS MODERATE 35: CPT | Performed by: PHYSICIAN ASSISTANT

## 2019-08-08 PROCEDURE — 0JQM0ZZ REPAIR LEFT UPPER LEG SUBCUTANEOUS TISSUE AND FASCIA, OPEN APPROACH: ICD-10-PCS | Performed by: SURGERY

## 2019-08-08 PROCEDURE — 85025 COMPLETE CBC W/AUTO DIFF WBC: CPT | Performed by: SURGERY

## 2019-08-08 PROCEDURE — 13160 SEC CLSR SURG WND/DEHSN XTN: CPT | Performed by: SURGERY

## 2019-08-08 PROCEDURE — 86901 BLOOD TYPING SEROLOGIC RH(D): CPT | Performed by: STUDENT IN AN ORGANIZED HEALTH CARE EDUCATION/TRAINING PROGRAM

## 2019-08-08 PROCEDURE — 86850 RBC ANTIBODY SCREEN: CPT | Performed by: STUDENT IN AN ORGANIZED HEALTH CARE EDUCATION/TRAINING PROGRAM

## 2019-08-08 PROCEDURE — 80048 BASIC METABOLIC PNL TOTAL CA: CPT | Performed by: SURGERY

## 2019-08-08 RX ORDER — DEXAMETHASONE SODIUM PHOSPHATE 10 MG/ML
INJECTION, SOLUTION INTRAMUSCULAR; INTRAVENOUS AS NEEDED
Status: DISCONTINUED | OUTPATIENT
Start: 2019-08-08 | End: 2019-08-08 | Stop reason: SURG

## 2019-08-08 RX ORDER — METOCLOPRAMIDE HYDROCHLORIDE 5 MG/ML
10 INJECTION INTRAMUSCULAR; INTRAVENOUS ONCE AS NEEDED
Status: DISCONTINUED | OUTPATIENT
Start: 2019-08-08 | End: 2019-08-08 | Stop reason: HOSPADM

## 2019-08-08 RX ORDER — HYDROMORPHONE HCL/PF 1 MG/ML
0.5 SYRINGE (ML) INJECTION
Status: COMPLETED | OUTPATIENT
Start: 2019-08-08 | End: 2019-08-08

## 2019-08-08 RX ORDER — LIDOCAINE HYDROCHLORIDE 10 MG/ML
INJECTION, SOLUTION EPIDURAL; INFILTRATION; INTRACAUDAL; PERINEURAL AS NEEDED
Status: DISCONTINUED | OUTPATIENT
Start: 2019-08-08 | End: 2019-08-08 | Stop reason: SURG

## 2019-08-08 RX ORDER — MIDAZOLAM HYDROCHLORIDE 1 MG/ML
INJECTION INTRAMUSCULAR; INTRAVENOUS AS NEEDED
Status: DISCONTINUED | OUTPATIENT
Start: 2019-08-08 | End: 2019-08-08 | Stop reason: SURG

## 2019-08-08 RX ORDER — ONDANSETRON 2 MG/ML
4 INJECTION INTRAMUSCULAR; INTRAVENOUS ONCE AS NEEDED
Status: DISCONTINUED | OUTPATIENT
Start: 2019-08-08 | End: 2019-08-08 | Stop reason: HOSPADM

## 2019-08-08 RX ORDER — ONDANSETRON 2 MG/ML
INJECTION INTRAMUSCULAR; INTRAVENOUS AS NEEDED
Status: DISCONTINUED | OUTPATIENT
Start: 2019-08-08 | End: 2019-08-08 | Stop reason: SURG

## 2019-08-08 RX ORDER — HYDROMORPHONE HCL/PF 1 MG/ML
SYRINGE (ML) INJECTION AS NEEDED
Status: DISCONTINUED | OUTPATIENT
Start: 2019-08-08 | End: 2019-08-08 | Stop reason: SURG

## 2019-08-08 RX ORDER — GINSENG 100 MG
CAPSULE ORAL AS NEEDED
Status: DISCONTINUED | OUTPATIENT
Start: 2019-08-08 | End: 2019-08-08 | Stop reason: HOSPADM

## 2019-08-08 RX ORDER — SODIUM CHLORIDE, SODIUM LACTATE, POTASSIUM CHLORIDE, CALCIUM CHLORIDE 600; 310; 30; 20 MG/100ML; MG/100ML; MG/100ML; MG/100ML
INJECTION, SOLUTION INTRAVENOUS CONTINUOUS PRN
Status: DISCONTINUED | OUTPATIENT
Start: 2019-08-08 | End: 2019-08-08 | Stop reason: SURG

## 2019-08-08 RX ORDER — KETAMINE HCL IN NACL, ISO-OSM 100MG/10ML
SYRINGE (ML) INJECTION AS NEEDED
Status: DISCONTINUED | OUTPATIENT
Start: 2019-08-08 | End: 2019-08-08 | Stop reason: SURG

## 2019-08-08 RX ORDER — CEFAZOLIN SODIUM 1 G/3ML
INJECTION, POWDER, FOR SOLUTION INTRAMUSCULAR; INTRAVENOUS AS NEEDED
Status: DISCONTINUED | OUTPATIENT
Start: 2019-08-08 | End: 2019-08-08 | Stop reason: SURG

## 2019-08-08 RX ORDER — FENTANYL CITRATE 50 UG/ML
INJECTION, SOLUTION INTRAMUSCULAR; INTRAVENOUS AS NEEDED
Status: DISCONTINUED | OUTPATIENT
Start: 2019-08-08 | End: 2019-08-08 | Stop reason: SURG

## 2019-08-08 RX ORDER — HYDROMORPHONE HCL/PF 1 MG/ML
0.5 SYRINGE (ML) INJECTION ONCE
Status: COMPLETED | OUTPATIENT
Start: 2019-08-08 | End: 2019-08-08

## 2019-08-08 RX ORDER — PROPOFOL 10 MG/ML
INJECTION, EMULSION INTRAVENOUS AS NEEDED
Status: DISCONTINUED | OUTPATIENT
Start: 2019-08-08 | End: 2019-08-08 | Stop reason: SURG

## 2019-08-08 RX ADMIN — HYDROMORPHONE HYDROCHLORIDE 1 MG: 1 INJECTION, SOLUTION INTRAMUSCULAR; INTRAVENOUS; SUBCUTANEOUS at 16:05

## 2019-08-08 RX ADMIN — SENNOSIDES 17.2 MG: 8.6 TABLET, FILM COATED ORAL at 21:36

## 2019-08-08 RX ADMIN — ACETAMINOPHEN 975 MG: 325 TABLET ORAL at 06:17

## 2019-08-08 RX ADMIN — HYDROMORPHONE HYDROCHLORIDE 1 MG: 1 INJECTION, SOLUTION INTRAMUSCULAR; INTRAVENOUS; SUBCUTANEOUS at 07:46

## 2019-08-08 RX ADMIN — CEFAZOLIN 2000 MG: 1 INJECTION, POWDER, FOR SOLUTION INTRAVENOUS at 15:42

## 2019-08-08 RX ADMIN — HYDROMORPHONE HYDROCHLORIDE 1 MG: 1 INJECTION, SOLUTION INTRAMUSCULAR; INTRAVENOUS; SUBCUTANEOUS at 18:26

## 2019-08-08 RX ADMIN — SERTRALINE HYDROCHLORIDE 50 MG: 50 TABLET ORAL at 09:00

## 2019-08-08 RX ADMIN — HYDROMORPHONE HYDROCHLORIDE 0.5 MG: 1 INJECTION, SOLUTION INTRAMUSCULAR; INTRAVENOUS; SUBCUTANEOUS at 16:40

## 2019-08-08 RX ADMIN — Medication 20 MG: at 16:01

## 2019-08-08 RX ADMIN — Medication 0.3 MG/KG/HR: at 17:30

## 2019-08-08 RX ADMIN — METHOCARBAMOL 750 MG: 750 TABLET, FILM COATED ORAL at 23:11

## 2019-08-08 RX ADMIN — HYDROMORPHONE HYDROCHLORIDE 0.5 MG: 1 INJECTION, SOLUTION INTRAMUSCULAR; INTRAVENOUS; SUBCUTANEOUS at 17:02

## 2019-08-08 RX ADMIN — Medication 0.3 MG/KG/HR: at 06:42

## 2019-08-08 RX ADMIN — HYDROMORPHONE HYDROCHLORIDE 0.5 MG: 1 INJECTION, SOLUTION INTRAMUSCULAR; INTRAVENOUS; SUBCUTANEOUS at 16:50

## 2019-08-08 RX ADMIN — Medication 10 MG: at 15:38

## 2019-08-08 RX ADMIN — QUETIAPINE FUMARATE 50 MG: 25 TABLET ORAL at 21:36

## 2019-08-08 RX ADMIN — OXYCODONE HYDROCHLORIDE 20 MG: 10 TABLET ORAL at 21:36

## 2019-08-08 RX ADMIN — HYDROMORPHONE HYDROCHLORIDE 0.5 MG: 1 INJECTION, SOLUTION INTRAMUSCULAR; INTRAVENOUS; SUBCUTANEOUS at 16:55

## 2019-08-08 RX ADMIN — MELATONIN 6 MG: 3 TAB ORAL at 21:36

## 2019-08-08 RX ADMIN — DEXAMETHASONE SODIUM PHOSPHATE 5 MG: 10 INJECTION, SOLUTION INTRAMUSCULAR; INTRAVENOUS at 15:45

## 2019-08-08 RX ADMIN — Medication 20 MG: at 15:26

## 2019-08-08 RX ADMIN — FENTANYL CITRATE 100 MCG: 50 INJECTION, SOLUTION INTRAMUSCULAR; INTRAVENOUS at 15:30

## 2019-08-08 RX ADMIN — OXYCODONE HYDROCHLORIDE 20 MG: 10 TABLET ORAL at 10:24

## 2019-08-08 RX ADMIN — SODIUM CHLORIDE, SODIUM LACTATE, POTASSIUM CHLORIDE, AND CALCIUM CHLORIDE: .6; .31; .03; .02 INJECTION, SOLUTION INTRAVENOUS at 15:20

## 2019-08-08 RX ADMIN — PROPOFOL 200 MG: 10 INJECTION, EMULSION INTRAVENOUS at 15:30

## 2019-08-08 RX ADMIN — ACETAMINOPHEN 975 MG: 325 TABLET ORAL at 21:36

## 2019-08-08 RX ADMIN — LIDOCAINE HYDROCHLORIDE 50 MG: 10 INJECTION, SOLUTION EPIDURAL; INFILTRATION; INTRACAUDAL; PERINEURAL at 15:30

## 2019-08-08 RX ADMIN — HYDROMORPHONE HYDROCHLORIDE 0.5 MG: 1 INJECTION, SOLUTION INTRAMUSCULAR; INTRAVENOUS; SUBCUTANEOUS at 17:09

## 2019-08-08 RX ADMIN — NICOTINE 14 MG: 14 PATCH TRANSDERMAL at 09:00

## 2019-08-08 RX ADMIN — SODIUM CHLORIDE 100 ML/HR: 0.9 INJECTION, SOLUTION INTRAVENOUS at 09:06

## 2019-08-08 RX ADMIN — METHOCARBAMOL 750 MG: 750 TABLET, FILM COATED ORAL at 11:18

## 2019-08-08 RX ADMIN — HYDROMORPHONE HYDROCHLORIDE 0.5 MG: 1 INJECTION, SOLUTION INTRAMUSCULAR; INTRAVENOUS; SUBCUTANEOUS at 16:35

## 2019-08-08 RX ADMIN — OXYCODONE HYDROCHLORIDE 20 MG: 10 TABLET ORAL at 14:29

## 2019-08-08 RX ADMIN — HYDROMORPHONE HYDROCHLORIDE 1 MG: 1 INJECTION, SOLUTION INTRAMUSCULAR; INTRAVENOUS; SUBCUTANEOUS at 23:11

## 2019-08-08 RX ADMIN — MIDAZOLAM 2 MG: 1 INJECTION INTRAMUSCULAR; INTRAVENOUS at 15:27

## 2019-08-08 RX ADMIN — HYDROMORPHONE HYDROCHLORIDE 0.5 MG: 1 INJECTION, SOLUTION INTRAMUSCULAR; INTRAVENOUS; SUBCUTANEOUS at 16:45

## 2019-08-08 RX ADMIN — OXYCODONE HYDROCHLORIDE 20 MG: 10 TABLET ORAL at 06:20

## 2019-08-08 RX ADMIN — ONDANSETRON 4 MG: 2 INJECTION INTRAMUSCULAR; INTRAVENOUS at 15:45

## 2019-08-08 RX ADMIN — METHOCARBAMOL 750 MG: 750 TABLET, FILM COATED ORAL at 06:17

## 2019-08-08 RX ADMIN — OXYCODONE HYDROCHLORIDE 20 MG: 10 TABLET ORAL at 17:14
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
## 2019-08-08 NOTE — PHYSICAL THERAPY NOTE
Physical Therapy Cancellation Note    Session deferred  Pt off the floor in OR when attempted for planned closure of LLE laceration  Will continue to follow and treat when appropriate      Connor Le PTA

## 2019-08-08 NOTE — QUICK NOTE
Post op note:    CC: "My leg hurts"    HPI:  Patient underwent washout and closure of his left lateral laceration and revision of the left medial laceration closure  Patient had considerable pain in PACU and received 3 5 mg of Dilaudid  Patient complains of pain during my visit as well but is noted to be not tachycardic and resting comfortably when I initially entered the room  Physical Exam   Constitutional: He appears well-developed and well-nourished  No distress  Delmus Ceci male in no apparent distress  HENT:   Head: Normocephalic and atraumatic  Right Ear: External ear normal    Left Ear: External ear normal    Eyes: Conjunctivae and EOM are normal    Neck: No JVD present  No tracheal deviation present  Cardiovascular: Normal rate, regular rhythm, normal heart sounds and intact distal pulses  No murmur heard  2/3 pedal pulses bilaterally  Cap refill less than 2 seconds on the right and left foot  Pulmonary/Chest: Breath sounds normal  No stridor  No respiratory distress  He has no wheezes  He has no rales  Abdominal: Soft  Bowel sounds are normal  He exhibits no distension and no mass  There is no tenderness  There is no rebound and no guarding  Genitourinary:   Genitourinary Comments: Deferred   Musculoskeletal: He exhibits tenderness  He exhibits no edema or deformity  5/5 muscular strength in dorsiflexion plantar flexion of the feet  Neurological: He exhibits normal muscle tone  Coordination normal    Intact light sensation lower extremities bilaterally  Skin: Skin is warm and dry  Capillary refill takes less than 2 seconds  No rash noted  He is not diaphoretic  No erythema  No pallor  Medial and lateral left-sided lower extremity lacerations covered postoperatively with no oozing or bleeding noted  Left lower extremity compartments are soft  Psychiatric: He has a normal mood and affect   His behavior is normal  Judgment and thought content normal    Nursing note and vitals reviewed

## 2019-08-08 NOTE — ANESTHESIA POSTPROCEDURE EVALUATION
Post-Op Assessment Note    CV Status:  Stable    Pain management: adequate     Mental Status:  Awake and lethargic   Hydration Status:  Euvolemic and stable   PONV Controlled:  None   Airway Patency:  Patent   Post Op Vitals Reviewed: Yes      Staff: CRNA           BP      Temp      Pulse     Resp      SpO2
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
## 2019-08-08 NOTE — ANESTHESIA PREPROCEDURE EVALUATION
Review of Systems/Medical History  Patient summary reviewed  Chart reviewed  No history of anesthetic complications     Cardiovascular  Negative cardio ROS Exercise tolerance (METS): >4,     Pulmonary  Smoker cigarette smoker  , Tobacco cessation counseling given ,        GI/Hepatic            Endo/Other     GYN       Hematology  Anemia ,     Musculoskeletal       Neurology   Psychology   Anxiety,              Physical Exam    Airway    Mallampati score: II  TM Distance: >3 FB  Neck ROM: full     Dental       Cardiovascular  Comment: Negative ROS,     Pulmonary      Other Findings        Anesthesia Plan  ASA Score- 2     Anesthesia Type- general with ASA Monitors  Additional Monitors:   Airway Plan: LMA  Plan Factors-    Induction- intravenous  Postoperative Plan-     Informed Consent- Anesthetic plan and risks discussed with patient  I personally reviewed this patient with the CRNA  Discussed and agreed on the Anesthesia Plan with the CRNA  Joanne Perez

## 2019-08-08 NOTE — PERIOPERATIVE NURSING NOTE
Called and spoke to red surgery updating team on patient's pain level  Patient received 3 5 mg of dilaudid while here in PACU and has continuous ketamin gtt  Patient VS stable, dressing clean dry and intact, only complaint is extreme pain  Will continue patient's ketamine drip and take patient to floor
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
## 2019-08-08 NOTE — ASSESSMENT & PLAN NOTE
- Left lower extremity laceration status post debridement, washout, and VAC dressing placement on 08/01/2019   - OR today for closure

## 2019-08-08 NOTE — ASSESSMENT & PLAN NOTE
Chief Complaint   Patient presents with   [facility] F/U       Patient is a 54-year-old female with PMH who is here for a hospital follow-up on January 7.  She went to [facility] ED for 48 hours of intractable pain in her left shoulder.  She had numbness and pain radiating in her upper back into her left arm.  Had no trauma.  She says it started after some light upper body weight lifting 3 days prior.  She originally tried Tylenol which did not help her.  The pain became so severe, she had to lay on her side on the floor to try and get some relief. It kept her from sleeping and eating so she had to go to the ED.    During her visit, she was not seen to have any weakness in her left upper extremity or neck.  Cervical x-ray showed multilevel degenerative changes in C5-C6 as well as mild changes in C6-C7. She was diagnosed with cervical radiculopathy and muscular strain of neck and back. Her pain was so bad she was vomiting. Her symptoms improved with steroids and anti-inflammatories as well as cyclobenzaprine and tramadol. She stayed overnight 1 night and then was sent home.     Since then, her pain is quite relieved. Rates it a 1/10 on the pain scale. She says pain gets worse if she lays on her left side and has more pressure. Isn't back to her normal exercise routine, but can walk and can do recumbent bicycle. Still has to ease into it. Has been doing many stretches that she was given by the [place]. Finished her medrol dosepak since the weekend. Only taking tylenol as needed, typically once a day. Still occasionally feels some radiation in her L forearm but is minimal. Was given a referral to PT, but needed to get one from [facility] per her insurance.    Saw Dr. Leos in October, had borderline elevated BP readings then. Patient has been tracking BP readings since and typically still range -150. In office today, /96.  Says her diet is extremely clean, low sodium. Exercises daily.        Review of  Ortho following  WBAT   PT/OT  Pain control Systems   Constitutional: Negative for chills and fever.   HENT: Negative for congestion.    Respiratory: Negative for shortness of breath.    Cardiovascular: Negative for chest pain.   Gastrointestinal: Negative for abdominal pain, diarrhea, nausea and vomiting.   Genitourinary: Negative for dysuria.   Musculoskeletal: Positive for neck pain and neck stiffness. Negative for back pain, gait problem and myalgias.        Occasional pain when she moves her neck in a certain direction   Neurological: Negative for dizziness and headaches.         ALLERGIES:   Allergen Reactions   • Gadolinium Derivatives SWELLING     From mri   • Iodinated Diagnostic Agents RASH          Current Outpatient Medications   Medication Sig Dispense Refill   • cyclobenzaprine (FLEXERIL) 10 MG tablet Take 10 mg by mouth.     • naproxen (NAPROSYN) 500 MG tablet Take 500 mg by mouth.     • loratadine (CLARITIN) 10 MG tablet Take 10 mg by mouth.     • lisinopril (ZESTRIL) 10 MG tablet Take 1 tablet by mouth daily. 90 tablet 3   • loratadine (WAL-ITIN) 10 MG tablet TAKE 1 TABLET DAILY.     • Multiple Vitamin (MULTIVITAMINS PO)      • Glucosamine-Chondroit-Vit C-Mn (GLUCOSAMINE-CHONDROITIN) Tab      • BIOTIN PO        No current facility-administered medications for this visit.          Immunization History   Administered Date(s) Administered   • Influenza, injectable, quadrivalent 10/30/2018   • Tdap 05/13/2016       Patient Active Problem List   Diagnosis   • Cervical radiculopathy   • Strain of thoracic region   • Strain of neck muscle   • Degeneration of cervical intervertebral disc       Social History     Socioeconomic History   • Marital status:      Spouse name: Not on file   • Number of children: Not on file   • Years of education: Not on file   • Highest education level: Not on file   Social Needs   • Financial resource strain: Not on file   • Food insecurity - worry: Not on file   • Food insecurity - inability: Not on file   •  Transportation needs - medical: Not on file   • Transportation needs - non-medical: Not on file   Occupational History   • Not on file   Tobacco Use   • Smoking status: Never Smoker   Substance and Sexual Activity   • Alcohol use: Yes     Comment: Socially   • Drug use: Not on file   • Sexual activity: Not on file   Other Topics Concern   • Not on file   Social History Narrative   • Not on file         Visit Vitals  BP (!) 154/96 (BP Location: [facility], Patient Position: Sitting, Cuff Size: Regular)   Pulse 64   Temp 96.1 °F (35.6 °C) (Tympanic)   Wt 74.4 kg (164 lb)   BMI 26.88 kg/m²       Physical Exam   Constitutional: She is oriented to person, place, and time. She appears well-developed and well-nourished.   HENT:   Head: Normocephalic and atraumatic.   Eyes: EOM are normal.   Neck: Neck supple.   Cardiovascular: Normal rate, regular rhythm and normal heart sounds.   Pulmonary/Chest: Effort normal and breath sounds normal.   Abdominal: Soft. Bowel sounds are normal.   Musculoskeletal:        Back:    Pain in neck and left upper back minimally reproducible on flexion and extension of neck    Neurological: She is alert and oriented to person, place, and time.   Psychiatric: She has a normal mood and affect. Her behavior is normal. Judgment and thought content normal.       Health Maintenance   Topic Date Due   • Depression Screening  12/31/1974   • Cervical Cancer Screening HPV CO-Testing  12/31/1992   • Colorectal Cancer Screening-Colonoscopy  12/31/2012   • Hepatitis C Screening  12/31/2013   • Breast Cancer Screening  01/06/2018   • DTaP/Tdap/Td Vaccine (2 - Td) 05/13/2026   • Influenza Vaccine  Completed         Assessment/Plan  Cervical radiculopathy  - SERVICE TO PHYSICAL THERAPY    Essential hypertension  - lisinopril (ZESTRIL) 10 MG tablet; Take 1 tablet by mouth daily.  Dispense: 90 tablet; Refill: 3  - BASIC METABOLIC PANEL; Future       Discussion  Patient is feeling much improved since her hospital visit.  We will refer her to PT to strengthen her neck for her cervical radiculopathy. She is taking tylenol PRN and can continue to do so for pain. Her BP has been elevated for months now despite an immaculate diet and daily exercise. We will trial her on lisinopril and have her come back for a blood draw to check BMP in 1-2 weeks. Side effects discussed with patient. She is to follow up in about 3 months.     Seen and discussed with Dr. Deric Wilson,   Internal Medicine, PGY-1

## 2019-08-09 PROCEDURE — 99232 SBSQ HOSP IP/OBS MODERATE 35: CPT | Performed by: PHYSICIAN ASSISTANT

## 2019-08-09 PROCEDURE — NC001 PR NO CHARGE: Performed by: SURGERY

## 2019-08-09 RX ADMIN — OXYCODONE HYDROCHLORIDE 20 MG: 10 TABLET ORAL at 19:38

## 2019-08-09 RX ADMIN — METHOCARBAMOL 750 MG: 750 TABLET, FILM COATED ORAL at 17:02

## 2019-08-09 RX ADMIN — HYDROMORPHONE HYDROCHLORIDE 1 MG: 1 INJECTION, SOLUTION INTRAMUSCULAR; INTRAVENOUS; SUBCUTANEOUS at 21:18

## 2019-08-09 RX ADMIN — HYDROMORPHONE HYDROCHLORIDE 1 MG: 1 INJECTION, SOLUTION INTRAMUSCULAR; INTRAVENOUS; SUBCUTANEOUS at 08:07

## 2019-08-09 RX ADMIN — SENNOSIDES 17.2 MG: 8.6 TABLET, FILM COATED ORAL at 21:18

## 2019-08-09 RX ADMIN — QUETIAPINE FUMARATE 50 MG: 25 TABLET ORAL at 23:40

## 2019-08-09 RX ADMIN — DOCUSATE SODIUM 100 MG: 100 CAPSULE, LIQUID FILLED ORAL at 17:02

## 2019-08-09 RX ADMIN — ACETAMINOPHEN 975 MG: 325 TABLET ORAL at 13:05

## 2019-08-09 RX ADMIN — MELATONIN 6 MG: 3 TAB ORAL at 23:40

## 2019-08-09 RX ADMIN — OXYCODONE HYDROCHLORIDE 20 MG: 10 TABLET ORAL at 15:32

## 2019-08-09 RX ADMIN — HEPARIN SODIUM 5000 UNITS: 5000 INJECTION INTRAVENOUS; SUBCUTANEOUS at 21:17

## 2019-08-09 RX ADMIN — HEPARIN SODIUM 5000 UNITS: 5000 INJECTION INTRAVENOUS; SUBCUTANEOUS at 13:05

## 2019-08-09 RX ADMIN — METHOCARBAMOL 750 MG: 750 TABLET, FILM COATED ORAL at 11:27

## 2019-08-09 RX ADMIN — Medication 0.3 MG/KG/HR: at 23:47

## 2019-08-09 RX ADMIN — HYDROMORPHONE HYDROCHLORIDE 1 MG: 1 INJECTION, SOLUTION INTRAMUSCULAR; INTRAVENOUS; SUBCUTANEOUS at 12:15

## 2019-08-09 RX ADMIN — ACETAMINOPHEN 975 MG: 325 TABLET ORAL at 05:16

## 2019-08-09 RX ADMIN — Medication 0.3 MG/KG/HR: at 04:34

## 2019-08-09 RX ADMIN — ACETAMINOPHEN 975 MG: 325 TABLET ORAL at 21:17

## 2019-08-09 RX ADMIN — NICOTINE 14 MG: 14 PATCH TRANSDERMAL at 08:07

## 2019-08-09 RX ADMIN — OXYCODONE HYDROCHLORIDE 20 MG: 10 TABLET ORAL at 23:40

## 2019-08-09 RX ADMIN — SERTRALINE HYDROCHLORIDE 50 MG: 50 TABLET ORAL at 08:07

## 2019-08-09 RX ADMIN — DOCUSATE SODIUM 100 MG: 100 CAPSULE, LIQUID FILLED ORAL at 08:07

## 2019-08-09 RX ADMIN — HEPARIN SODIUM 5000 UNITS: 5000 INJECTION INTRAVENOUS; SUBCUTANEOUS at 05:16

## 2019-08-09 RX ADMIN — Medication 0.3 MG/KG/HR: at 14:13

## 2019-08-09 RX ADMIN — OXYCODONE HYDROCHLORIDE 20 MG: 10 TABLET ORAL at 06:57

## 2019-08-09 RX ADMIN — METHOCARBAMOL 750 MG: 750 TABLET, FILM COATED ORAL at 05:16

## 2019-08-09 RX ADMIN — HYDROMORPHONE HYDROCHLORIDE 1 MG: 1 INJECTION, SOLUTION INTRAMUSCULAR; INTRAVENOUS; SUBCUTANEOUS at 17:02

## 2019-08-09 RX ADMIN — METHOCARBAMOL 750 MG: 750 TABLET, FILM COATED ORAL at 23:40

## 2019-08-09 RX ADMIN — OXYCODONE HYDROCHLORIDE 20 MG: 10 TABLET ORAL at 11:11

## 2019-08-09 NOTE — PLAN OF CARE
Problem: Prexisting or High Potential for Compromised Skin Integrity  Goal: Skin integrity is maintained or improved  Description  INTERVENTIONS:  - Identify patients at risk for skin breakdown  - Assess and monitor skin integrity  - Assess and monitor nutrition and hydration status  - Monitor labs (i e  albumin)  - Assess for incontinence   - Turn and reposition patient  - Assist with mobility/ambulation  - Relieve pressure over bony prominences  - Avoid friction and shearing  - Provide appropriate hygiene as needed including keeping skin clean and dry  - Evaluate need for skin moisturizer/barrier cream  - Collaborate with interdisciplinary team (i e  Nutrition, Rehabilitation, etc )   - Patient/family teaching  Outcome: Progressing     Problem: Potential for Falls  Goal: Patient will remain free of falls  Description  INTERVENTIONS:  - Assess patient frequently for physical needs  -  Identify cognitive and physical deficits and behaviors that affect risk of falls    -  [place] fall precautions as indicated by assessment   - Educate patient/family on patient safety including physical limitations  - Instruct patient to call for assistance with activity based on assessment  - Modify environment to reduce risk of injury  - Consider OT/PT consult to assist with strengthening/mobility  Outcome: Progressing     Problem: PAIN - ADULT  Goal: Verbalizes/displays adequate comfort level or baseline comfort level  Description  Interventions:  - Encourage patient to monitor pain and request assistance  - Assess pain using appropriate pain scale  - Administer analgesics based on type and severity of pain and evaluate response  - Implement non-pharmacological measures as appropriate and evaluate response  - Consider cultural and social influences on pain and pain management  - Notify physician/advanced practitioner if interventions unsuccessful or patient reports new pain  Outcome: Progressing     Problem: INFECTION - ADULT  Goal: Absence or prevention of progression during hospitalization  Description  INTERVENTIONS:  - Assess and monitor for signs and symptoms of infection  - Monitor lab/diagnostic results  - Monitor all insertion sites, i e  indwelling lines, tubes, and drains  - Monitor endotracheal (as able) and nasal secretions for changes in amount and color  - [place] appropriate cooling/warming therapies per order  - Administer medications as ordered  - Instruct and encourage patient and family to use good hand hygiene technique  - Identify and instruct in appropriate isolation precautions for identified infection/condition  Outcome: Progressing  Goal: Absence of fever/infection during neutropenic period  Description  INTERVENTIONS:  - Monitor WBC  - Implement neutropenic guidelines  Outcome: Progressing     Problem: SAFETY ADULT  Goal: Maintain or return to baseline ADL function  Description  INTERVENTIONS:  -  Assess patient's ability to carry out ADLs; assess patient's baseline for ADL function and identify physical deficits which impact ability to perform ADLs (bathing, care of mouth/teeth, toileting, grooming, dressing, etc )  - Assess/evaluate cause of self-care deficits   - Assess range of motion  - Assess patient's mobility; develop plan if impaired  - Assess patient's need for assistive devices and provide as appropriate  - Encourage maximum independence but intervene and supervise when necessary  ¯ Involve family in performance of ADLs  ¯ Assess for home care needs following discharge   ¯ Request OT consult to assist with ADL evaluation and planning for discharge  ¯ Provide patient education as appropriate  Outcome: Progressing  Goal: Maintain or return mobility status to optimal level  Description  INTERVENTIONS:  - Assess patient's baseline mobility status (ambulation, transfers, stairs, etc )    - Identify cognitive and physical deficits and behaviors that affect mobility  - Identify mobility aids required to assist with transfers and/or ambulation (gait belt, sit-to-stand, lift, walker, cane, etc )  - [place] fall precautions as indicated by assessment  - Record patient progress and toleration of activity level on Mobility SBAR; progress patient to next Phase/Stage  - Instruct patient to call for assistance with activity based on assessment  - Request Rehabilitation consult to assist with strengthening/weightbearing, etc   Outcome: Progressing     Problem: DISCHARGE PLANNING  Goal: Discharge to home or other facility with appropriate resources  Description  INTERVENTIONS:  - Identify barriers to discharge w/patient and caregiver  - Arrange for needed discharge resources and transportation as appropriate  - Identify discharge learning needs (meds, wound care, etc )  - Arrange for interpretive services to assist at discharge as needed  - Refer to Case Management Department for coordinating discharge planning if the patient needs post-hospital services based on physician/advanced practitioner order or complex needs related to functional status, cognitive ability, or social support system  Outcome: Progressing     Problem: Knowledge Deficit  Goal: Patient/family/caregiver demonstrates understanding of disease process, treatment plan, medications, and discharge instructions  Description  Complete learning assessment and assess knowledge base  Interventions:  - Provide teaching at level of understanding  - Provide teaching via preferred learning methods  Outcome: Progressing     Problem: NEUROSENSORY - ADULT  Goal: Achieves stable or improved neurological status  Description  INTERVENTIONS  - Monitor and report changes in neurological status  - Initiate measures to prevent increased intracranial pressure  - Maintain blood pressure and fluid volume within ordered parameters to optimize cerebral perfusion  - Monitor temperature, glucose, and sodium or any other associated labs   Initiate appropriate interventions as ordered  - Monitor for seizure activity   - Administer anti-seizure medications as ordered  Outcome: Progressing     Problem: CARDIOVASCULAR - ADULT  Goal: Maintains optimal cardiac output and hemodynamic stability  Description  INTERVENTIONS:  - Monitor I/O, vital signs and rhythm  - Monitor for S/S and trends of decreased cardiac output i e  bleeding, hypotension  - Administer and titrate ordered vasoactive medications to optimize hemodynamic stability  - Assess quality of pulses, skin color and temperature  - Assess for signs of decreased coronary artery perfusion - ex   Angina  - Instruct patient to report change in severity of symptoms  Outcome: Progressing  Goal: Absence of cardiac dysrhythmias or at baseline rhythm  Description  INTERVENTIONS:  - Continuous cardiac monitoring, monitor vital signs, obtain 12 lead EKG if indicated  - Administer antiarrhythmic and heart rate control medications as ordered  - Monitor electrolytes and administer replacement therapy as ordered  Outcome: Progressing     Problem: RESPIRATORY - ADULT  Goal: Achieves optimal ventilation and oxygenation  Description  INTERVENTIONS:  - Assess for changes in respiratory status  - Assess for changes in mentation and behavior  - Position to facilitate oxygenation and minimize respiratory effort  - Oxygen administration by appropriate delivery method based on oxygen saturation (per order) or ABGs  - Initiate smoking cessation education as indicated  - Encourage broncho-pulmonary hygiene including cough, deep breathe, Incentive Spirometry  - Assess the need for suctioning and aspirate as needed  - Assess and instruct to report SOB or any respiratory difficulty  - Respiratory Therapy support as indicated  Outcome: Progressing     Problem: GASTROINTESTINAL - ADULT  Goal: Minimal or absence of nausea and/or vomiting  Description  INTERVENTIONS:  - Administer IV fluids as ordered to ensure adequate hydration  - Maintain NPO status until nausea and vomiting are resolved  - Nasogastric tube as ordered  - Administer ordered antiemetic medications as needed  - Provide nonpharmacologic comfort measures as appropriate  - Advance diet as tolerated, if ordered  - Nutrition services referral to assist patient with adequate nutrition and appropriate food choices  Outcome: Progressing  Goal: Maintains or returns to baseline bowel function  Description  INTERVENTIONS:  - Assess bowel function  - Encourage oral fluids to ensure adequate hydration  - Administer IV fluids as ordered to ensure adequate hydration  - Administer ordered medications as needed  - Encourage mobilization and activity  - Nutrition services referral to assist patient with appropriate food choices  Outcome: Progressing  Goal: Maintains adequate nutritional intake  Description  INTERVENTIONS:  - Monitor percentage of each meal consumed  - Identify factors contributing to decreased intake, treat as appropriate  - Assist with meals as needed  - Monitor I&O, WT and lab values  - Obtain nutrition services referral as needed  Outcome: Progressing     Problem: GENITOURINARY - ADULT  Goal: Maintains or returns to baseline urinary function  Description  INTERVENTIONS:  - Assess urinary function  - Encourage oral fluids to ensure adequate hydration  - Administer IV fluids as ordered to ensure adequate hydration  - Administer ordered medications as needed  - Offer frequent toileting  - Follow urinary retention protocol if ordered  Outcome: Progressing  Goal: Absence of urinary retention  Description  INTERVENTIONS:  - Assess patients ability to void and empty bladder  - Monitor I/O  - Bladder scan as needed  - Discuss with physician/AP medications to alleviate retention as needed  - Discuss catheterization for long term situations as appropriate  Outcome: Progressing     Problem: METABOLIC, FLUID AND ELECTROLYTES - ADULT  Goal: Electrolytes maintained within normal limits  Description  INTERVENTIONS:  - Monitor labs and assess patient for signs and symptoms of electrolyte imbalances  - Administer electrolyte replacement as ordered  - Monitor response to electrolyte replacements, including repeat lab results as appropriate  - Instruct patient on fluid and nutrition as appropriate  Outcome: Progressing  Goal: Fluid balance maintained  Description  INTERVENTIONS:  - Monitor labs and assess for signs and symptoms of volume excess or deficit  - Monitor I/O and WT  - Instruct patient on fluid and nutrition as appropriate  Outcome: Progressing  Goal: Glucose maintained within target range  Description  INTERVENTIONS:  - Monitor Blood Glucose as ordered  - Assess for signs and symptoms of hyperglycemia and hypoglycemia  - Administer ordered medications to maintain glucose within target range  - Assess nutritional intake and initiate nutrition service referral as needed  Outcome: Progressing     Problem: SKIN/TISSUE INTEGRITY - ADULT  Goal: Skin integrity remains intact  Description  INTERVENTIONS  - Identify patients at risk for skin breakdown  - Assess and monitor skin integrity  - Assess and monitor nutrition and hydration status  - Monitor labs (i e  albumin)  - Assess for incontinence   - Turn and reposition patient  - Assist with mobility/ambulation  - Relieve pressure over bony prominences  - Avoid friction and shearing  - Provide appropriate hygiene as needed including keeping skin clean and dry  - Evaluate need for skin moisturizer/barrier cream  - Collaborate with interdisciplinary team (i e  Nutrition, Rehabilitation, etc )   - Patient/family teaching  Outcome: Progressing  Goal: Incision(s), wounds(s) or drain site(s) healing without S/S of infection  Description  INTERVENTIONS  - Assess and document risk factors for skin impairment   - Assess and document dressing, incision, wound bed, drain sites and surrounding tissue  - Initiate Nutrition services consult and/or wound management as needed  Outcome: Progressing  Goal: Oral mucous membranes remain intact  Description  INTERVENTIONS  - Assess oral mucosa and hygiene practices  - Implement preventative oral hygiene regimen  - Implement oral medicated treatments as ordered  - Initiate Nutrition services referral as needed  Outcome: Progressing     Problem: HEMATOLOGIC - ADULT  Goal: Maintains hematologic stability  Description  INTERVENTIONS  - Assess for signs and symptoms of bleeding or hemorrhage  - Monitor labs  - Administer supportive blood products/factors as ordered and appropriate  Outcome: Progressing     Problem: MUSCULOSKELETAL - ADULT  Goal: Maintain or return mobility to safest level of function  Description  INTERVENTIONS:  - Assess patient's ability to carry out ADLs; assess patient's baseline for ADL function and identify physical deficits which impact ability to perform ADLs (bathing, care of mouth/teeth, toileting, grooming, dressing, etc )  - Assess/evaluate cause of self-care deficits   - Assess range of motion  - Assess patient's mobility; develop plan if impaired  - Assess patient's need for assistive devices and provide as appropriate  - Encourage maximum independence but intervene and supervise when necessary  - Involve family in performance of ADLs  - Assess for home care needs following discharge   - Request OT consult to assist with ADL evaluation and planning for discharge  - Provide patient education as appropriate  Outcome: Progressing  Goal: Maintain proper alignment of affected body part  Description  INTERVENTIONS:  - Support, maintain and protect limb and body alignment  - Provide pt/fam with appropriate education  Outcome: Progressing     Problem: Nutrition/Hydration-ADULT  Goal: Nutrient/Hydration intake appropriate for improving, restoring or maintaining nutritional needs  Description  Monitor and assess patient's nutrition/hydration status for malnutrition (ex- brittle hair, bruises, dry skin, pale skin and conjunctiva, muscle wasting, smooth red tongue, and disorientation)  Collaborate with interdisciplinary team and initiate plan and interventions as ordered  Monitor patient's weight and dietary intake as ordered or per policy  Utilize nutrition screening tool and intervene per policy  Determine patient's food preferences and provide high-protein, high-caloric foods as appropriate       INTERVENTIONS:  - Monitor oral intake, urinary output, labs, and treatment plans  - Assess nutrition and hydration status and recommend course of action  - Evaluate amount of meals eaten  - Assist patient with eating if necessary   - Allow adequate time for meals  - Recommend/ encourage appropriate diets, oral nutritional supplements, and vitamin/mineral supplements  - Order, calculate, and assess calorie counts as needed  - Recommend, monitor, and adjust tube feedings and TPN/PPN based on assessed needs  - Assess need for intravenous fluids  - Provide specific nutrition/hydration education as appropriate  - Include patient/family/caregiver in decisions related to nutrition  Outcome: Progressing

## 2019-08-09 NOTE — PROGRESS NOTES
Progress Note - Steph Wynn 30 y o  male MRN: [id number]    Unit/Bed#: [facility] [place] 628-01 Encounter: [id number]      Assessment:  Patient is a 68-year-old male status post washout and closure of left lateral thigh laceration and revision of left medial laceration closure  VSS  Afebrile  Dressings clean, dry, intact  Plan:  Regular diet  Pain control  Nausea control      Subjective:   Denied fever, chills, chest pain, shortness of breath, nausea, vomiting this morning  Objective:     Vitals: Blood pressure 109/59, pulse 75, temperature 98 1 °F (36 7 °C), resp  rate 20, height 6' (1 829 m), weight 76 2 kg (168 lb), SpO2 99 %  ,Body mass index is 22 78 kg/m²        Intake/Output Summary (Last 24 hours) at 8/9/2019 0452  Last data filed at 8/9/2019 0434  Gross per 24 hour   Intake 4236 27 ml   Output 2520 ml   Net 1716 27 ml     Scheduled Meds:  Current Facility-Administered Medications:  acetaminophen 975 mg Oral [facility] Abby Roth DO    docusate sodium 100 mg Oral BID Abby Roth DO    glycopyrrolate 0 2 mg Intravenous Q4H PRN Joana Roth DO    haloperidol lactate 2 mg Intramuscular Q30 Min PRN Joana Roth DO    heparin (porcine) 5,000 Units Subcutaneous [facility] Abby Hunter DO    HYDROmorphone 1 mg Intravenous Q3H PRN Joana Roth DO    ketamine 0 3 mg/kg/hr Intravenous Continuous Marianna Schulz DO Last Rate: 0 3 mg/kg/hr (08/09/19 0434)   lidocaine 1 patch Topical Daily Abby Roth DO    lidocaine 1 patch Topical Daily Abby Roth DO    LORazepam 1 mg Intravenous Q1H PRN Joana Roth DO    melatonin 6 mg Oral HS Marianna Schulz DO    methocarbamol 750 mg Oral Q6H [facility] Abby Hunter,     nicotine 14 mg Transdermal Daily Abby Roth DO    ondansetron 4 mg Intravenous Q4H PRN Joana Roth, DO    oxyCODONE 20 mg Oral Q4H PRN Joana Roth, DO    oxyCODONE 15 mg Oral Q4H PRN Marianna Schulz, DO QUEtiapine 50 mg Oral HS Veria Grade, DO    senna 2 tablet Oral HS Dion Geovany Gonzalez, DO    sertraline 50 mg Oral Daily Abby Roth, DO    sodium chloride 100 mL/hr Intravenous Continuous Veria Grade, DO Last Rate: Stopped (08/08/19 1916)     Continuous Infusions:  ketamine 0 3 mg/kg/hr Last Rate: 0 3 mg/kg/hr (08/09/19 0434)   sodium chloride 100 mL/hr Last Rate: Stopped (08/08/19 1916)     PRN Meds: glycopyrrolate    haloperidol lactate    HYDROmorphone    LORazepam    ondansetron    oxyCODONE    oxyCODONE    Physical Exam  General: NAD  HEENT: NC/AT  MMM  Cv: RRR  Lungs: normal effort  Ab: Soft, NT/ND  Ex: no CCE  Capillary refill less than 2 sec R and left foot  DP/ PT pulses 2+ b/l  Neuro: AAOx3      Invasive Devices     Peripheral Intravenous Line            Peripheral IV 08/05/19 Right;Proximal Antecubital 3 days    Peripheral IV 08/08/19 Left Forearm less than 1 day                Lab, Imaging and other studies: I have personally reviewed pertinent reports      VTE Pharmacologic Prophylaxis: Heparin  VTE Mechanical Prophylaxis: sequential compression device

## 2019-08-09 NOTE — PLAN OF CARE
Problem: Prexisting or High Potential for Compromised Skin Integrity  Goal: Skin integrity is maintained or improved  Description  INTERVENTIONS:  - Identify patients at risk for skin breakdown  - Assess and monitor skin integrity  - Assess and monitor nutrition and hydration status  - Monitor labs (i e  albumin)  - Assess for incontinence   - Turn and reposition patient  - Assist with mobility/ambulation  - Relieve pressure over bony prominences  - Avoid friction and shearing  - Provide appropriate hygiene as needed including keeping skin clean and dry  - Evaluate need for skin moisturizer/barrier cream  - Collaborate with interdisciplinary team (i e  Nutrition, Rehabilitation, etc )   - Patient/family teaching  Outcome: Progressing     Problem: Potential for Falls  Goal: Patient will remain free of falls  Description  INTERVENTIONS:  - Assess patient frequently for physical needs  -  Identify cognitive and physical deficits and behaviors that affect risk of falls    -  [place] fall precautions as indicated by assessment   - Educate patient/family on patient safety including physical limitations  - Instruct patient to call for assistance with activity based on assessment  - Modify environment to reduce risk of injury  - Consider OT/PT consult to assist with strengthening/mobility  Outcome: Progressing     Problem: PAIN - ADULT  Goal: Verbalizes/displays adequate comfort level or baseline comfort level  Description  Interventions:  - Encourage patient to monitor pain and request assistance  - Assess pain using appropriate pain scale  - Administer analgesics based on type and severity of pain and evaluate response  - Implement non-pharmacological measures as appropriate and evaluate response  - Consider cultural and social influences on pain and pain management  - Notify physician/advanced practitioner if interventions unsuccessful or patient reports new pain  Outcome: Progressing     Problem: INFECTION - ADULT  Goal: Absence or prevention of progression during hospitalization  Description  INTERVENTIONS:  - Assess and monitor for signs and symptoms of infection  - Monitor lab/diagnostic results  - Monitor all insertion sites, i e  indwelling lines, tubes, and drains  - Monitor endotracheal (as able) and nasal secretions for changes in amount and color  - [place] appropriate cooling/warming therapies per order  - Administer medications as ordered  - Instruct and encourage patient and family to use good hand hygiene technique  - Identify and instruct in appropriate isolation precautions for identified infection/condition  Outcome: Progressing  Goal: Absence of fever/infection during neutropenic period  Description  INTERVENTIONS:  - Monitor WBC  - Implement neutropenic guidelines  Outcome: Progressing     Problem: SAFETY ADULT  Goal: Maintain or return to baseline ADL function  Description  INTERVENTIONS:  -  Assess patient's ability to carry out ADLs; assess patient's baseline for ADL function and identify physical deficits which impact ability to perform ADLs (bathing, care of mouth/teeth, toileting, grooming, dressing, etc )  - Assess/evaluate cause of self-care deficits   - Assess range of motion  - Assess patient's mobility; develop plan if impaired  - Assess patient's need for assistive devices and provide as appropriate  - Encourage maximum independence but intervene and supervise when necessary  ¯ Involve family in performance of ADLs  ¯ Assess for home care needs following discharge   ¯ Request OT consult to assist with ADL evaluation and planning for discharge  ¯ Provide patient education as appropriate  Outcome: Progressing  Goal: Maintain or return mobility status to optimal level  Description  INTERVENTIONS:  - Assess patient's baseline mobility status (ambulation, transfers, stairs, etc )    - Identify cognitive and physical deficits and behaviors that affect mobility  - Identify mobility aids required to assist with transfers and/or ambulation (gait belt, sit-to-stand, lift, walker, cane, etc )  - [place] fall precautions as indicated by assessment  - Record patient progress and toleration of activity level on Mobility SBAR; progress patient to next Phase/Stage  - Instruct patient to call for assistance with activity based on assessment  - Request Rehabilitation consult to assist with strengthening/weightbearing, etc   Outcome: Progressing     Problem: DISCHARGE PLANNING  Goal: Discharge to home or other facility with appropriate resources  Description  INTERVENTIONS:  - Identify barriers to discharge w/patient and caregiver  - Arrange for needed discharge resources and transportation as appropriate  - Identify discharge learning needs (meds, wound care, etc )  - Arrange for interpretive services to assist at discharge as needed  - Refer to Case Management Department for coordinating discharge planning if the patient needs post-hospital services based on physician/advanced practitioner order or complex needs related to functional status, cognitive ability, or social support system  Outcome: Progressing     Problem: Knowledge Deficit  Goal: Patient/family/caregiver demonstrates understanding of disease process, treatment plan, medications, and discharge instructions  Description  Complete learning assessment and assess knowledge base  Interventions:  - Provide teaching at level of understanding  - Provide teaching via preferred learning methods  Outcome: Progressing     Problem: NEUROSENSORY - ADULT  Goal: Achieves stable or improved neurological status  Description  INTERVENTIONS  - Monitor and report changes in neurological status  - Initiate measures to prevent increased intracranial pressure  - Maintain blood pressure and fluid volume within ordered parameters to optimize cerebral perfusion  - Monitor temperature, glucose, and sodium or any other associated labs   Initiate appropriate interventions as ordered  - Monitor for seizure activity   - Administer anti-seizure medications as ordered  Outcome: Progressing     Problem: CARDIOVASCULAR - ADULT  Goal: Maintains optimal cardiac output and hemodynamic stability  Description  INTERVENTIONS:  - Monitor I/O, vital signs and rhythm  - Monitor for S/S and trends of decreased cardiac output i e  bleeding, hypotension  - Administer and titrate ordered vasoactive medications to optimize hemodynamic stability  - Assess quality of pulses, skin color and temperature  - Assess for signs of decreased coronary artery perfusion - ex   Angina  - Instruct patient to report change in severity of symptoms  Outcome: Progressing  Goal: Absence of cardiac dysrhythmias or at baseline rhythm  Description  INTERVENTIONS:  - Continuous cardiac monitoring, monitor vital signs, obtain 12 lead EKG if indicated  - Administer antiarrhythmic and heart rate control medications as ordered  - Monitor electrolytes and administer replacement therapy as ordered  Outcome: Progressing     Problem: RESPIRATORY - ADULT  Goal: Achieves optimal ventilation and oxygenation  Description  INTERVENTIONS:  - Assess for changes in respiratory status  - Assess for changes in mentation and behavior  - Position to facilitate oxygenation and minimize respiratory effort  - Oxygen administration by appropriate delivery method based on oxygen saturation (per order) or ABGs  - Initiate smoking cessation education as indicated  - Encourage broncho-pulmonary hygiene including cough, deep breathe, Incentive Spirometry  - Assess the need for suctioning and aspirate as needed  - Assess and instruct to report SOB or any respiratory difficulty  - Respiratory Therapy support as indicated  Outcome: Progressing     Problem: GASTROINTESTINAL - ADULT  Goal: Minimal or absence of nausea and/or vomiting  Description  INTERVENTIONS:  - Administer IV fluids as ordered to ensure adequate hydration  - Maintain NPO status until nausea and vomiting are resolved  - Nasogastric tube as ordered  - Administer ordered antiemetic medications as needed  - Provide nonpharmacologic comfort measures as appropriate  - Advance diet as tolerated, if ordered  - Nutrition services referral to assist patient with adequate nutrition and appropriate food choices  Outcome: Progressing  Goal: Maintains or returns to baseline bowel function  Description  INTERVENTIONS:  - Assess bowel function  - Encourage oral fluids to ensure adequate hydration  - Administer IV fluids as ordered to ensure adequate hydration  - Administer ordered medications as needed  - Encourage mobilization and activity  - Nutrition services referral to assist patient with appropriate food choices  Outcome: Progressing  Goal: Maintains adequate nutritional intake  Description  INTERVENTIONS:  - Monitor percentage of each meal consumed  - Identify factors contributing to decreased intake, treat as appropriate  - Assist with meals as needed  - Monitor I&O, WT and lab values  - Obtain nutrition services referral as needed  Outcome: Progressing     Problem: GENITOURINARY - ADULT  Goal: Maintains or returns to baseline urinary function  Description  INTERVENTIONS:  - Assess urinary function  - Encourage oral fluids to ensure adequate hydration  - Administer IV fluids as ordered to ensure adequate hydration  - Administer ordered medications as needed  - Offer frequent toileting  - Follow urinary retention protocol if ordered  Outcome: Progressing  Goal: Absence of urinary retention  Description  INTERVENTIONS:  - Assess patients ability to void and empty bladder  - Monitor I/O  - Bladder scan as needed  - Discuss with physician/AP medications to alleviate retention as needed  - Discuss catheterization for long term situations as appropriate  Outcome: Progressing     Problem: METABOLIC, FLUID AND ELECTROLYTES - ADULT  Goal: Electrolytes maintained within normal limits  Description  INTERVENTIONS:  - Monitor labs and assess patient for signs and symptoms of electrolyte imbalances  - Administer electrolyte replacement as ordered  - Monitor response to electrolyte replacements, including repeat lab results as appropriate  - Instruct patient on fluid and nutrition as appropriate  Outcome: Progressing  Goal: Fluid balance maintained  Description  INTERVENTIONS:  - Monitor labs and assess for signs and symptoms of volume excess or deficit  - Monitor I/O and WT  - Instruct patient on fluid and nutrition as appropriate  Outcome: Progressing  Goal: Glucose maintained within target range  Description  INTERVENTIONS:  - Monitor Blood Glucose as ordered  - Assess for signs and symptoms of hyperglycemia and hypoglycemia  - Administer ordered medications to maintain glucose within target range  - Assess nutritional intake and initiate nutrition service referral as needed  Outcome: Progressing     Problem: SKIN/TISSUE INTEGRITY - ADULT  Goal: Skin integrity remains intact  Description  INTERVENTIONS  - Identify patients at risk for skin breakdown  - Assess and monitor skin integrity  - Assess and monitor nutrition and hydration status  - Monitor labs (i e  albumin)  - Assess for incontinence   - Turn and reposition patient  - Assist with mobility/ambulation  - Relieve pressure over bony prominences  - Avoid friction and shearing  - Provide appropriate hygiene as needed including keeping skin clean and dry  - Evaluate need for skin moisturizer/barrier cream  - Collaborate with interdisciplinary team (i e  Nutrition, Rehabilitation, etc )   - Patient/family teaching  Outcome: Progressing  Goal: Incision(s), wounds(s) or drain site(s) healing without S/S of infection  Description  INTERVENTIONS  - Assess and document risk factors for skin impairment   - Assess and document dressing, incision, wound bed, drain sites and surrounding tissue  - Initiate Nutrition services consult and/or wound management as needed  Outcome: Progressing  Goal: Oral mucous membranes remain intact  Description  INTERVENTIONS  - Assess oral mucosa and hygiene practices  - Implement preventative oral hygiene regimen  - Implement oral medicated treatments as ordered  - Initiate Nutrition services referral as needed  Outcome: Progressing     Problem: HEMATOLOGIC - ADULT  Goal: Maintains hematologic stability  Description  INTERVENTIONS  - Assess for signs and symptoms of bleeding or hemorrhage  - Monitor labs  - Administer supportive blood products/factors as ordered and appropriate  Outcome: Progressing     Problem: MUSCULOSKELETAL - ADULT  Goal: Maintain or return mobility to safest level of function  Description  INTERVENTIONS:  - Assess patient's ability to carry out ADLs; assess patient's baseline for ADL function and identify physical deficits which impact ability to perform ADLs (bathing, care of mouth/teeth, toileting, grooming, dressing, etc )  - Assess/evaluate cause of self-care deficits   - Assess range of motion  - Assess patient's mobility; develop plan if impaired  - Assess patient's need for assistive devices and provide as appropriate  - Encourage maximum independence but intervene and supervise when necessary  - Involve family in performance of ADLs  - Assess for home care needs following discharge   - Request OT consult to assist with ADL evaluation and planning for discharge  - Provide patient education as appropriate  Outcome: Progressing  Goal: Maintain proper alignment of affected body part  Description  INTERVENTIONS:  - Support, maintain and protect limb and body alignment  - Provide pt/fam with appropriate education  Outcome: Progressing     Problem: Nutrition/Hydration-ADULT  Goal: Nutrient/Hydration intake appropriate for improving, restoring or maintaining nutritional needs  Description  Monitor and assess patient's nutrition/hydration status for malnutrition (ex- brittle hair, bruises, dry skin, pale skin and conjunctiva, muscle wasting, smooth red tongue, and disorientation)  Collaborate with interdisciplinary team and initiate plan and interventions as ordered  Monitor patient's weight and dietary intake as ordered or per policy  Utilize nutrition screening tool and intervene per policy  Determine patient's food preferences and provide high-protein, high-caloric foods as appropriate       INTERVENTIONS:  - Monitor oral intake, urinary output, labs, and treatment plans  - Assess nutrition and hydration status and recommend course of action  - Evaluate amount of meals eaten  - Assist patient with eating if necessary   - Allow adequate time for meals  - Recommend/ encourage appropriate diets, oral nutritional supplements, and vitamin/mineral supplements  - Order, calculate, and assess calorie counts as needed  - Recommend, monitor, and adjust tube feedings and TPN/PPN based on assessed needs  - Assess need for intravenous fluids  - Provide specific nutrition/hydration education as appropriate  - Include patient/family/caregiver in decisions related to nutrition  Outcome: Progressing
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
## 2019-08-10 PROCEDURE — 99232 SBSQ HOSP IP/OBS MODERATE 35: CPT | Performed by: SURGERY

## 2019-08-10 PROCEDURE — 97116 GAIT TRAINING THERAPY: CPT

## 2019-08-10 RX ORDER — GABAPENTIN 300 MG/1
300 CAPSULE ORAL 3 TIMES DAILY
Status: DISCONTINUED | OUTPATIENT
Start: 2019-08-10 | End: 2019-08-11 | Stop reason: HOSPADM

## 2019-08-10 RX ORDER — HYDROMORPHONE HCL/PF 1 MG/ML
1 SYRINGE (ML) INJECTION EVERY 6 HOURS PRN
Status: DISCONTINUED | OUTPATIENT
Start: 2019-08-10 | End: 2019-08-11 | Stop reason: HOSPADM

## 2019-08-10 RX ORDER — IBUPROFEN 600 MG/1
600 TABLET ORAL EVERY 8 HOURS SCHEDULED
Status: DISCONTINUED | OUTPATIENT
Start: 2019-08-10 | End: 2019-08-11 | Stop reason: HOSPADM

## 2019-08-10 RX ADMIN — OXYCODONE HYDROCHLORIDE 20 MG: 10 TABLET ORAL at 10:48

## 2019-08-10 RX ADMIN — HYDROMORPHONE HYDROCHLORIDE 1 MG: 1 INJECTION, SOLUTION INTRAMUSCULAR; INTRAVENOUS; SUBCUTANEOUS at 12:03

## 2019-08-10 RX ADMIN — METHOCARBAMOL 750 MG: 750 TABLET, FILM COATED ORAL at 11:55

## 2019-08-10 RX ADMIN — NICOTINE 14 MG: 14 PATCH TRANSDERMAL at 08:30

## 2019-08-10 RX ADMIN — GABAPENTIN 300 MG: 300 CAPSULE ORAL at 21:22

## 2019-08-10 RX ADMIN — OXYCODONE HYDROCHLORIDE 20 MG: 10 TABLET ORAL at 06:24

## 2019-08-10 RX ADMIN — ACETAMINOPHEN 975 MG: 325 TABLET ORAL at 13:37

## 2019-08-10 RX ADMIN — MELATONIN 6 MG: 3 TAB ORAL at 21:22

## 2019-08-10 RX ADMIN — IBUPROFEN 600 MG: 600 TABLET ORAL at 17:18

## 2019-08-10 RX ADMIN — METHOCARBAMOL 750 MG: 750 TABLET, FILM COATED ORAL at 17:18

## 2019-08-10 RX ADMIN — HEPARIN SODIUM 5000 UNITS: 5000 INJECTION INTRAVENOUS; SUBCUTANEOUS at 21:22

## 2019-08-10 RX ADMIN — QUETIAPINE FUMARATE 50 MG: 25 TABLET ORAL at 21:22

## 2019-08-10 RX ADMIN — ACETAMINOPHEN 975 MG: 325 TABLET ORAL at 21:22

## 2019-08-10 RX ADMIN — GABAPENTIN 300 MG: 300 CAPSULE ORAL at 17:18

## 2019-08-10 RX ADMIN — DOCUSATE SODIUM 100 MG: 100 CAPSULE, LIQUID FILLED ORAL at 08:31

## 2019-08-10 RX ADMIN — METHOCARBAMOL 750 MG: 750 TABLET, FILM COATED ORAL at 06:24

## 2019-08-10 RX ADMIN — HYDROMORPHONE HYDROCHLORIDE 1 MG: 1 INJECTION, SOLUTION INTRAMUSCULAR; INTRAVENOUS; SUBCUTANEOUS at 18:25

## 2019-08-10 RX ADMIN — HEPARIN SODIUM 5000 UNITS: 5000 INJECTION INTRAVENOUS; SUBCUTANEOUS at 13:37

## 2019-08-10 RX ADMIN — SENNOSIDES 17.2 MG: 8.6 TABLET, FILM COATED ORAL at 21:22

## 2019-08-10 RX ADMIN — OXYCODONE HYDROCHLORIDE 20 MG: 10 TABLET ORAL at 14:50

## 2019-08-10 RX ADMIN — ACETAMINOPHEN 975 MG: 325 TABLET ORAL at 06:23

## 2019-08-10 RX ADMIN — Medication 0.3 MG/KG/HR: at 11:56

## 2019-08-10 RX ADMIN — DOCUSATE SODIUM 100 MG: 100 CAPSULE, LIQUID FILLED ORAL at 17:18

## 2019-08-10 RX ADMIN — HEPARIN SODIUM 5000 UNITS: 5000 INJECTION INTRAVENOUS; SUBCUTANEOUS at 06:23

## 2019-08-10 RX ADMIN — SERTRALINE HYDROCHLORIDE 50 MG: 50 TABLET ORAL at 08:31

## 2019-08-10 RX ADMIN — HYDROMORPHONE HYDROCHLORIDE 1 MG: 1 INJECTION, SOLUTION INTRAMUSCULAR; INTRAVENOUS; SUBCUTANEOUS at 08:31

## 2019-08-10 NOTE — PLAN OF CARE
Problem: Prexisting or High Potential for Compromised Skin Integrity  Goal: Skin integrity is maintained or improved  Description  INTERVENTIONS:  - Identify patients at risk for skin breakdown  - Assess and monitor skin integrity  - Assess and monitor nutrition and hydration status  - Monitor labs (i e  albumin)  - Assess for incontinence   - Turn and reposition patient  - Assist with mobility/ambulation  - Relieve pressure over bony prominences  - Avoid friction and shearing  - Provide appropriate hygiene as needed including keeping skin clean and dry  - Evaluate need for skin moisturizer/barrier cream  - Collaborate with interdisciplinary team (i e  Nutrition, Rehabilitation, etc )   - Patient/family teaching  Outcome: Progressing     Problem: Potential for Falls  Goal: Patient will remain free of falls  Description  INTERVENTIONS:  - Assess patient frequently for physical needs  -  Identify cognitive and physical deficits and behaviors that affect risk of falls    -  [place] fall precautions as indicated by assessment   - Educate patient/family on patient safety including physical limitations  - Instruct patient to call for assistance with activity based on assessment  - Modify environment to reduce risk of injury  - Consider OT/PT consult to assist with strengthening/mobility  Outcome: Progressing     Problem: PAIN - ADULT  Goal: Verbalizes/displays adequate comfort level or baseline comfort level  Description  Interventions:  - Encourage patient to monitor pain and request assistance  - Assess pain using appropriate pain scale  - Administer analgesics based on type and severity of pain and evaluate response  - Implement non-pharmacological measures as appropriate and evaluate response  - Consider cultural and social influences on pain and pain management  - Notify physician/advanced practitioner if interventions unsuccessful or patient reports new pain  Outcome: Progressing     Problem: INFECTION - ADULT  Goal: Absence or prevention of progression during hospitalization  Description  INTERVENTIONS:  - Assess and monitor for signs and symptoms of infection  - Monitor lab/diagnostic results  - Monitor all insertion sites, i e  indwelling lines, tubes, and drains  - Monitor endotracheal (as able) and nasal secretions for changes in amount and color  - [place] appropriate cooling/warming therapies per order  - Administer medications as ordered  - Instruct and encourage patient and family to use good hand hygiene technique  - Identify and instruct in appropriate isolation precautions for identified infection/condition  Outcome: Progressing  Goal: Absence of fever/infection during neutropenic period  Description  INTERVENTIONS:  - Monitor WBC  - Implement neutropenic guidelines  Outcome: Progressing     Problem: SAFETY ADULT  Goal: Maintain or return to baseline ADL function  Description  INTERVENTIONS:  -  Assess patient's ability to carry out ADLs; assess patient's baseline for ADL function and identify physical deficits which impact ability to perform ADLs (bathing, care of mouth/teeth, toileting, grooming, dressing, etc )  - Assess/evaluate cause of self-care deficits   - Assess range of motion  - Assess patient's mobility; develop plan if impaired  - Assess patient's need for assistive devices and provide as appropriate  - Encourage maximum independence but intervene and supervise when necessary  ¯ Involve family in performance of ADLs  ¯ Assess for home care needs following discharge   ¯ Request OT consult to assist with ADL evaluation and planning for discharge  ¯ Provide patient education as appropriate  Outcome: Progressing  Goal: Maintain or return mobility status to optimal level  Description  INTERVENTIONS:  - Assess patient's baseline mobility status (ambulation, transfers, stairs, etc )    - Identify cognitive and physical deficits and behaviors that affect mobility  - Identify mobility aids required to assist with transfers and/or ambulation (gait belt, sit-to-stand, lift, walker, cane, etc )  - [place] fall precautions as indicated by assessment  - Record patient progress and toleration of activity level on Mobility SBAR; progress patient to next Phase/Stage  - Instruct patient to call for assistance with activity based on assessment  - Request Rehabilitation consult to assist with strengthening/weightbearing, etc   Outcome: Progressing     Problem: DISCHARGE PLANNING  Goal: Discharge to home or other facility with appropriate resources  Description  INTERVENTIONS:  - Identify barriers to discharge w/patient and caregiver  - Arrange for needed discharge resources and transportation as appropriate  - Identify discharge learning needs (meds, wound care, etc )  - Arrange for interpretive services to assist at discharge as needed  - Refer to Case Management Department for coordinating discharge planning if the patient needs post-hospital services based on physician/advanced practitioner order or complex needs related to functional status, cognitive ability, or social support system  Outcome: Progressing     Problem: Knowledge Deficit  Goal: Patient/family/caregiver demonstrates understanding of disease process, treatment plan, medications, and discharge instructions  Description  Complete learning assessment and assess knowledge base  Interventions:  - Provide teaching at level of understanding  - Provide teaching via preferred learning methods  Outcome: Progressing     Problem: NEUROSENSORY - ADULT  Goal: Achieves stable or improved neurological status  Description  INTERVENTIONS  - Monitor and report changes in neurological status  - Initiate measures to prevent increased intracranial pressure  - Maintain blood pressure and fluid volume within ordered parameters to optimize cerebral perfusion  - Monitor temperature, glucose, and sodium or any other associated labs   Initiate appropriate interventions as ordered  - Monitor for seizure activity   - Administer anti-seizure medications as ordered  Outcome: Progressing     Problem: CARDIOVASCULAR - ADULT  Goal: Maintains optimal cardiac output and hemodynamic stability  Description  INTERVENTIONS:  - Monitor I/O, vital signs and rhythm  - Monitor for S/S and trends of decreased cardiac output i e  bleeding, hypotension  - Administer and titrate ordered vasoactive medications to optimize hemodynamic stability  - Assess quality of pulses, skin color and temperature  - Assess for signs of decreased coronary artery perfusion - ex   Angina  - Instruct patient to report change in severity of symptoms  Outcome: Progressing  Goal: Absence of cardiac dysrhythmias or at baseline rhythm  Description  INTERVENTIONS:  - Continuous cardiac monitoring, monitor vital signs, obtain 12 lead EKG if indicated  - Administer antiarrhythmic and heart rate control medications as ordered  - Monitor electrolytes and administer replacement therapy as ordered  Outcome: Progressing     Problem: RESPIRATORY - ADULT  Goal: Achieves optimal ventilation and oxygenation  Description  INTERVENTIONS:  - Assess for changes in respiratory status  - Assess for changes in mentation and behavior  - Position to facilitate oxygenation and minimize respiratory effort  - Oxygen administration by appropriate delivery method based on oxygen saturation (per order) or ABGs  - Initiate smoking cessation education as indicated  - Encourage broncho-pulmonary hygiene including cough, deep breathe, Incentive Spirometry  - Assess the need for suctioning and aspirate as needed  - Assess and instruct to report SOB or any respiratory difficulty  - Respiratory Therapy support as indicated  Outcome: Progressing     Problem: GASTROINTESTINAL - ADULT  Goal: Minimal or absence of nausea and/or vomiting  Description  INTERVENTIONS:  - Administer IV fluids as ordered to ensure adequate hydration  - Maintain NPO status until nausea and vomiting are resolved  - Nasogastric tube as ordered  - Administer ordered antiemetic medications as needed  - Provide nonpharmacologic comfort measures as appropriate  - Advance diet as tolerated, if ordered  - Nutrition services referral to assist patient with adequate nutrition and appropriate food choices  Outcome: Progressing  Goal: Maintains or returns to baseline bowel function  Description  INTERVENTIONS:  - Assess bowel function  - Encourage oral fluids to ensure adequate hydration  - Administer IV fluids as ordered to ensure adequate hydration  - Administer ordered medications as needed  - Encourage mobilization and activity  - Nutrition services referral to assist patient with appropriate food choices  Outcome: Progressing  Goal: Maintains adequate nutritional intake  Description  INTERVENTIONS:  - Monitor percentage of each meal consumed  - Identify factors contributing to decreased intake, treat as appropriate  - Assist with meals as needed  - Monitor I&O, WT and lab values  - Obtain nutrition services referral as needed  Outcome: Progressing     Problem: GENITOURINARY - ADULT  Goal: Maintains or returns to baseline urinary function  Description  INTERVENTIONS:  - Assess urinary function  - Encourage oral fluids to ensure adequate hydration  - Administer IV fluids as ordered to ensure adequate hydration  - Administer ordered medications as needed  - Offer frequent toileting  - Follow urinary retention protocol if ordered  Outcome: Progressing  Goal: Absence of urinary retention  Description  INTERVENTIONS:  - Assess patients ability to void and empty bladder  - Monitor I/O  - Bladder scan as needed  - Discuss with physician/AP medications to alleviate retention as needed  - Discuss catheterization for long term situations as appropriate  Outcome: Progressing     Problem: METABOLIC, FLUID AND ELECTROLYTES - ADULT  Goal: Electrolytes maintained within normal limits  Description  INTERVENTIONS:  - Monitor labs and assess patient for signs and symptoms of electrolyte imbalances  - Administer electrolyte replacement as ordered  - Monitor response to electrolyte replacements, including repeat lab results as appropriate  - Instruct patient on fluid and nutrition as appropriate  Outcome: Progressing  Goal: Fluid balance maintained  Description  INTERVENTIONS:  - Monitor labs and assess for signs and symptoms of volume excess or deficit  - Monitor I/O and WT  - Instruct patient on fluid and nutrition as appropriate  Outcome: Progressing  Goal: Glucose maintained within target range  Description  INTERVENTIONS:  - Monitor Blood Glucose as ordered  - Assess for signs and symptoms of hyperglycemia and hypoglycemia  - Administer ordered medications to maintain glucose within target range  - Assess nutritional intake and initiate nutrition service referral as needed  Outcome: Progressing     Problem: SKIN/TISSUE INTEGRITY - ADULT  Goal: Skin integrity remains intact  Description  INTERVENTIONS  - Identify patients at risk for skin breakdown  - Assess and monitor skin integrity  - Assess and monitor nutrition and hydration status  - Monitor labs (i e  albumin)  - Assess for incontinence   - Turn and reposition patient  - Assist with mobility/ambulation  - Relieve pressure over bony prominences  - Avoid friction and shearing  - Provide appropriate hygiene as needed including keeping skin clean and dry  - Evaluate need for skin moisturizer/barrier cream  - Collaborate with interdisciplinary team (i e  Nutrition, Rehabilitation, etc )   - Patient/family teaching  Outcome: Progressing  Goal: Incision(s), wounds(s) or drain site(s) healing without S/S of infection  Description  INTERVENTIONS  - Assess and document risk factors for skin impairment   - Assess and document dressing, incision, wound bed, drain sites and surrounding tissue  - Initiate Nutrition services consult and/or wound management as needed  Outcome: Progressing  Goal: Oral mucous membranes remain intact  Description  INTERVENTIONS  - Assess oral mucosa and hygiene practices  - Implement preventative oral hygiene regimen  - Implement oral medicated treatments as ordered  - Initiate Nutrition services referral as needed  Outcome: Progressing     Problem: HEMATOLOGIC - ADULT  Goal: Maintains hematologic stability  Description  INTERVENTIONS  - Assess for signs and symptoms of bleeding or hemorrhage  - Monitor labs  - Administer supportive blood products/factors as ordered and appropriate  Outcome: Progressing     Problem: MUSCULOSKELETAL - ADULT  Goal: Maintain or return mobility to safest level of function  Description  INTERVENTIONS:  - Assess patient's ability to carry out ADLs; assess patient's baseline for ADL function and identify physical deficits which impact ability to perform ADLs (bathing, care of mouth/teeth, toileting, grooming, dressing, etc )  - Assess/evaluate cause of self-care deficits   - Assess range of motion  - Assess patient's mobility; develop plan if impaired  - Assess patient's need for assistive devices and provide as appropriate  - Encourage maximum independence but intervene and supervise when necessary  - Involve family in performance of ADLs  - Assess for home care needs following discharge   - Request OT consult to assist with ADL evaluation and planning for discharge  - Provide patient education as appropriate  Outcome: Progressing  Goal: Maintain proper alignment of affected body part  Description  INTERVENTIONS:  - Support, maintain and protect limb and body alignment  - Provide pt/fam with appropriate education  Outcome: Progressing     Problem: Nutrition/Hydration-ADULT  Goal: Nutrient/Hydration intake appropriate for improving, restoring or maintaining nutritional needs  Description  Monitor and assess patient's nutrition/hydration status for malnutrition (ex- brittle hair, bruises, dry skin, pale skin and conjunctiva, muscle wasting, smooth red tongue, and disorientation)  Collaborate with interdisciplinary team and initiate plan and interventions as ordered  Monitor patient's weight and dietary intake as ordered or per policy  Utilize nutrition screening tool and intervene per policy  Determine patient's food preferences and provide high-protein, high-caloric foods as appropriate       INTERVENTIONS:  - Monitor oral intake, urinary output, labs, and treatment plans  - Assess nutrition and hydration status and recommend course of action  - Evaluate amount of meals eaten  - Assist patient with eating if necessary   - Allow adequate time for meals  - Recommend/ encourage appropriate diets, oral nutritional supplements, and vitamin/mineral supplements  - Order, calculate, and assess calorie counts as needed  - Recommend, monitor, and adjust tube feedings and TPN/PPN based on assessed needs  - Assess need for intravenous fluids  - Provide specific nutrition/hydration education as appropriate  - Include patient/family/caregiver in decisions related to nutrition  Outcome: Progressing

## 2019-08-10 NOTE — PLAN OF CARE
Problem: PHYSICAL THERAPY ADULT  Goal: Performs mobility at highest level of function for planned discharge setting  See evaluation for individualized goals  Description  Treatment/Interventions: (P) Functional transfer training, LE strengthening/ROM, Therapeutic exercise, Endurance training, Bed mobility, Gait training, Spoke to nursing, OT          See flowsheet documentation for full assessment, interventions and recommendations  Outcome: Progressing  Note:   Prognosis: Good  Problem List: Decreased strength, Decreased endurance, Impaired balance, Decreased mobility, Pain, Orthopedic restrictions  Assessment: Pt supien in bed upon arrival, pleasant and agreeable to OOB ambualtion and stair trial  Pt performes all transfers, ambualtion and stair negotiation with supervision  Pt was able to negotiate 15 steps using 2 rails and forward/bkwd method  Pt has met all goals and will be d/c to home once medically cleared  Pt will continue to benenfit fromOPPT after d/c to increase strength and endurawnce and improve balance  Barriers to Discharge: None     Recommendation: Home with family support, Outpatient PT     PT - OK to Discharge: Yes(when medically ready)    See flowsheet documentation for full assessment
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
## 2019-08-10 NOTE — SOCIAL WORK
Cm met with patient explained and offered Host, per pt he is not interested at this time as he is already in a program, however he cannot remember the name of the program

## 2019-08-10 NOTE — PHYSICAL THERAPY NOTE
Physical Therapy Progress Note     08/10/19 0933   Pain Assessment   Pain Assessment 0-10   Pain Score 8   Pain Type Acute pain;Surgical pain   Pain Location Leg   Pain Orientation Left   Hospital Pain Intervention(s) Medication (See MAR); Repositioned; Ambulation/increased activity   Restrictions/Precautions   Weight Bearing Precautions Per Order Yes   RUE Weight Bearing Per Order WBAT   LLE Weight Bearing Per Order WBAT   Braces or Orthoses LE Braces  (HKB)   Other Precautions WBS;Pain; Fall Risk;Multiple lines   General   Chart Reviewed Yes   Response to Previous Treatment Patient with no complaints from previous session  Family/Caregiver Present No   Cognition   Overall Cognitive Status WFL   Arousal/Participation Alert; Responsive; Cooperative   Attention Within functional limits   Orientation Level Oriented X4   Memory Within functional limits   Following Commands Follows all commands and directions without difficulty   Bed Mobility   Supine to Sit 5  Supervision   Additional items Increased time required;HOB elevated; Bedrails   Transfers   Sit to Stand 5  Supervision   Additional items Increased time required   Stand to Sit 5  Supervision   Additional items Increased time required   Ambulation/Elevation   Gait pattern Improper Weight shift;Decreased foot clearance;Decreased L stance;Shuffling;Excessively slow   Gait Assistance 5  Supervision   Additional items Verbal cues   Assistive Device Rolling walker   Distance 360'   Stair Management Assistance 5  Supervision   Additional items Assist x 1;Verbal cues; Increased time required   Stair Management Technique One rail R;One rail L;Foreward;Backward; Step to pattern   Number of Stairs 15   Activity Tolerance   Activity Tolerance Patient tolerated treatment well   Nurse Made Aware RN ok to see   Assessment   Prognosis Good   Problem List Decreased strength;Decreased endurance; Impaired balance;Decreased mobility;Pain;Orthopedic restrictions   Assessment Pt jay in bed upon arrival, pleasant and agreeable to OOB ambualtion and stair trial  Pt performes all transfers, ambualtion and stair negotiation with supervision  Pt was able to negotiate 15 steps using 2 rails and forward/bkwd method  Pt has met all goals and will be d/c to home once medically cleared  Pt will continue to benenfit fromOPPT after d/c to increase strength and endurawnce and improve balance  Barriers to Discharge None   Goals   Patient Goals none stated   STG Expiration Date 08/16/19   Plan   Treatment/Interventions Functional transfer training;LE strengthening/ROM; Elevations; Therapeutic exercise; Endurance training;Bed mobility;Gait training   Progress Improving as expected   Recommendation   Recommendation Home with family support; Outpatient PT   Equipment Recommended Walker   PT - OK to Discharge Yes  (when medically ready)     Chelsy Farris, PTA
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
## 2019-08-11 VITALS
DIASTOLIC BLOOD PRESSURE: 61 MMHG | SYSTOLIC BLOOD PRESSURE: 117 MMHG | TEMPERATURE: 99.3 F | BODY MASS INDEX: 22.75 KG/M2 | HEART RATE: 91 BPM | OXYGEN SATURATION: 98 % | WEIGHT: 168 LBS | HEIGHT: 72 IN | RESPIRATION RATE: 18 BRPM

## 2019-08-11 PROCEDURE — 99238 HOSP IP/OBS DSCHRG MGMT 30/<: CPT | Performed by: SURGERY

## 2019-08-11 PROCEDURE — NC001 PR NO CHARGE: Performed by: SURGERY

## 2019-08-11 RX ORDER — SENNOSIDES 8.6 MG
2 TABLET ORAL
Qty: 120 EACH | Refills: 0 | Status: SHIPPED | OUTPATIENT
Start: 2019-08-11

## 2019-08-11 RX ORDER — IBUPROFEN 600 MG/1
600 TABLET ORAL EVERY 8 HOURS SCHEDULED
Qty: 30 TABLET | Refills: 0 | Status: SHIPPED | OUTPATIENT
Start: 2019-08-11

## 2019-08-11 RX ORDER — GABAPENTIN 300 MG/1
300 CAPSULE ORAL 3 TIMES DAILY
Qty: 60 CAPSULE | Refills: 0 | Status: SHIPPED | OUTPATIENT
Start: 2019-08-11

## 2019-08-11 RX ORDER — METHOCARBAMOL 750 MG/1
750 TABLET, FILM COATED ORAL EVERY 6 HOURS SCHEDULED
Qty: 60 TABLET | Refills: 0 | Status: SHIPPED | OUTPATIENT
Start: 2019-08-11

## 2019-08-11 RX ORDER — ACETAMINOPHEN 325 MG/1
975 TABLET ORAL EVERY 8 HOURS SCHEDULED
Qty: 30 TABLET | Refills: 0 | Status: SHIPPED | OUTPATIENT
Start: 2019-08-11

## 2019-08-11 RX ORDER — DOCUSATE SODIUM 100 MG/1
100 CAPSULE, LIQUID FILLED ORAL 2 TIMES DAILY
Qty: 10 CAPSULE | Refills: 0 | Status: SHIPPED | OUTPATIENT
Start: 2019-08-11

## 2019-08-11 RX ADMIN — IBUPROFEN 600 MG: 600 TABLET ORAL at 05:49

## 2019-08-11 RX ADMIN — GABAPENTIN 300 MG: 300 CAPSULE ORAL at 08:57

## 2019-08-11 RX ADMIN — ACETAMINOPHEN 975 MG: 325 TABLET ORAL at 05:50

## 2019-08-11 RX ADMIN — OXYCODONE HYDROCHLORIDE 20 MG: 10 TABLET ORAL at 11:50

## 2019-08-11 RX ADMIN — OXYCODONE HYDROCHLORIDE 20 MG: 10 TABLET ORAL at 05:54

## 2019-08-11 RX ADMIN — METHOCARBAMOL 750 MG: 750 TABLET, FILM COATED ORAL at 05:54

## 2019-08-11 RX ADMIN — IBUPROFEN 600 MG: 600 TABLET ORAL at 14:06

## 2019-08-11 RX ADMIN — SERTRALINE HYDROCHLORIDE 50 MG: 50 TABLET ORAL at 08:57

## 2019-08-11 RX ADMIN — DOCUSATE SODIUM 100 MG: 100 CAPSULE, LIQUID FILLED ORAL at 08:57

## 2019-08-11 RX ADMIN — HEPARIN SODIUM 5000 UNITS: 5000 INJECTION INTRAVENOUS; SUBCUTANEOUS at 05:50

## 2019-08-11 RX ADMIN — NICOTINE 14 MG: 14 PATCH TRANSDERMAL at 08:57

## 2019-08-11 RX ADMIN — ACETAMINOPHEN 975 MG: 325 TABLET ORAL at 14:06

## 2019-08-11 RX ADMIN — METHOCARBAMOL 750 MG: 750 TABLET, FILM COATED ORAL at 11:50

## 2019-08-11 RX ADMIN — HYDROMORPHONE HYDROCHLORIDE 1 MG: 1 INJECTION, SOLUTION INTRAMUSCULAR; INTRAVENOUS; SUBCUTANEOUS at 08:58

## 2019-08-11 NOTE — SOCIAL WORK
Met with pt  I offered HOST to pt  Pt insists he is enrolled in a program, however, he continues to indicate he can not remember what program he is enrolled in  Pt also informed CM, he is not in need of any intervention upon d/c  ZAHIRA Nicholson, made aware
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
## 2019-08-11 NOTE — PLAN OF CARE
Problem: Prexisting or High Potential for Compromised Skin Integrity  Goal: Skin integrity is maintained or improved  Description  INTERVENTIONS:  - Identify patients at risk for skin breakdown  - Assess and monitor skin integrity  - Assess and monitor nutrition and hydration status  - Monitor labs (i e  albumin)  - Assess for incontinence   - Turn and reposition patient  - Assist with mobility/ambulation  - Relieve pressure over bony prominences  - Avoid friction and shearing  - Provide appropriate hygiene as needed including keeping skin clean and dry  - Evaluate need for skin moisturizer/barrier cream  - Collaborate with interdisciplinary team (i e  Nutrition, Rehabilitation, etc )   - Patient/family teaching  Outcome: Progressing     Problem: Potential for Falls  Goal: Patient will remain free of falls  Description  INTERVENTIONS:  - Assess patient frequently for physical needs  -  Identify cognitive and physical deficits and behaviors that affect risk of falls    -  [place] fall precautions as indicated by assessment   - Educate patient/family on patient safety including physical limitations  - Instruct patient to call for assistance with activity based on assessment  - Modify environment to reduce risk of injury  - Consider OT/PT consult to assist with strengthening/mobility  Outcome: Progressing     Problem: PAIN - ADULT  Goal: Verbalizes/displays adequate comfort level or baseline comfort level  Description  Interventions:  - Encourage patient to monitor pain and request assistance  - Assess pain using appropriate pain scale  - Administer analgesics based on type and severity of pain and evaluate response  - Implement non-pharmacological measures as appropriate and evaluate response  - Consider cultural and social influences on pain and pain management  - Notify physician/advanced practitioner if interventions unsuccessful or patient reports new pain  Outcome: Progressing     Problem: INFECTION - ADULT  Goal: Absence or prevention of progression during hospitalization  Description  INTERVENTIONS:  - Assess and monitor for signs and symptoms of infection  - Monitor lab/diagnostic results  - Monitor all insertion sites, i e  indwelling lines, tubes, and drains  - Monitor endotracheal (as able) and nasal secretions for changes in amount and color  - [place] appropriate cooling/warming therapies per order  - Administer medications as ordered  - Instruct and encourage patient and family to use good hand hygiene technique  - Identify and instruct in appropriate isolation precautions for identified infection/condition  Outcome: Progressing  Goal: Absence of fever/infection during neutropenic period  Description  INTERVENTIONS:  - Monitor WBC  - Implement neutropenic guidelines  Outcome: Progressing     Problem: SAFETY ADULT  Goal: Maintain or return to baseline ADL function  Description  INTERVENTIONS:  -  Assess patient's ability to carry out ADLs; assess patient's baseline for ADL function and identify physical deficits which impact ability to perform ADLs (bathing, care of mouth/teeth, toileting, grooming, dressing, etc )  - Assess/evaluate cause of self-care deficits   - Assess range of motion  - Assess patient's mobility; develop plan if impaired  - Assess patient's need for assistive devices and provide as appropriate  - Encourage maximum independence but intervene and supervise when necessary  ¯ Involve family in performance of ADLs  ¯ Assess for home care needs following discharge   ¯ Request OT consult to assist with ADL evaluation and planning for discharge  ¯ Provide patient education as appropriate  Outcome: Progressing  Goal: Maintain or return mobility status to optimal level  Description  INTERVENTIONS:  - Assess patient's baseline mobility status (ambulation, transfers, stairs, etc )    - Identify cognitive and physical deficits and behaviors that affect mobility  - Identify mobility aids required to assist with transfers and/or ambulation (gait belt, sit-to-stand, lift, walker, cane, etc )  - [place] fall precautions as indicated by assessment  - Record patient progress and toleration of activity level on Mobility SBAR; progress patient to next Phase/Stage  - Instruct patient to call for assistance with activity based on assessment  - Request Rehabilitation consult to assist with strengthening/weightbearing, etc   Outcome: Progressing     Problem: DISCHARGE PLANNING  Goal: Discharge to home or other facility with appropriate resources  Description  INTERVENTIONS:  - Identify barriers to discharge w/patient and caregiver  - Arrange for needed discharge resources and transportation as appropriate  - Identify discharge learning needs (meds, wound care, etc )  - Arrange for interpretive services to assist at discharge as needed  - Refer to Case Management Department for coordinating discharge planning if the patient needs post-hospital services based on physician/advanced practitioner order or complex needs related to functional status, cognitive ability, or social support system  Outcome: Progressing     Problem: Knowledge Deficit  Goal: Patient/family/caregiver demonstrates understanding of disease process, treatment plan, medications, and discharge instructions  Description  Complete learning assessment and assess knowledge base  Interventions:  - Provide teaching at level of understanding  - Provide teaching via preferred learning methods  Outcome: Progressing     Problem: NEUROSENSORY - ADULT  Goal: Achieves stable or improved neurological status  Description  INTERVENTIONS  - Monitor and report changes in neurological status  - Initiate measures to prevent increased intracranial pressure  - Maintain blood pressure and fluid volume within ordered parameters to optimize cerebral perfusion  - Monitor temperature, glucose, and sodium or any other associated labs   Initiate appropriate interventions as ordered  - Monitor for seizure activity   - Administer anti-seizure medications as ordered  Outcome: Progressing     Problem: CARDIOVASCULAR - ADULT  Goal: Maintains optimal cardiac output and hemodynamic stability  Description  INTERVENTIONS:  - Monitor I/O, vital signs and rhythm  - Monitor for S/S and trends of decreased cardiac output i e  bleeding, hypotension  - Administer and titrate ordered vasoactive medications to optimize hemodynamic stability  - Assess quality of pulses, skin color and temperature  - Assess for signs of decreased coronary artery perfusion - ex   Angina  - Instruct patient to report change in severity of symptoms  Outcome: Progressing  Goal: Absence of cardiac dysrhythmias or at baseline rhythm  Description  INTERVENTIONS:  - Continuous cardiac monitoring, monitor vital signs, obtain 12 lead EKG if indicated  - Administer antiarrhythmic and heart rate control medications as ordered  - Monitor electrolytes and administer replacement therapy as ordered  Outcome: Progressing     Problem: RESPIRATORY - ADULT  Goal: Achieves optimal ventilation and oxygenation  Description  INTERVENTIONS:  - Assess for changes in respiratory status  - Assess for changes in mentation and behavior  - Position to facilitate oxygenation and minimize respiratory effort  - Oxygen administration by appropriate delivery method based on oxygen saturation (per order) or ABGs  - Initiate smoking cessation education as indicated  - Encourage broncho-pulmonary hygiene including cough, deep breathe, Incentive Spirometry  - Assess the need for suctioning and aspirate as needed  - Assess and instruct to report SOB or any respiratory difficulty  - Respiratory Therapy support as indicated  Outcome: Progressing     Problem: GASTROINTESTINAL - ADULT  Goal: Minimal or absence of nausea and/or vomiting  Description  INTERVENTIONS:  - Administer IV fluids as ordered to ensure adequate hydration  - Maintain NPO status until nausea and vomiting are resolved  - Nasogastric tube as ordered  - Administer ordered antiemetic medications as needed  - Provide nonpharmacologic comfort measures as appropriate  - Advance diet as tolerated, if ordered  - Nutrition services referral to assist patient with adequate nutrition and appropriate food choices  Outcome: Progressing  Goal: Maintains or returns to baseline bowel function  Description  INTERVENTIONS:  - Assess bowel function  - Encourage oral fluids to ensure adequate hydration  - Administer IV fluids as ordered to ensure adequate hydration  - Administer ordered medications as needed  - Encourage mobilization and activity  - Nutrition services referral to assist patient with appropriate food choices  Outcome: Progressing  Goal: Maintains adequate nutritional intake  Description  INTERVENTIONS:  - Monitor percentage of each meal consumed  - Identify factors contributing to decreased intake, treat as appropriate  - Assist with meals as needed  - Monitor I&O, WT and lab values  - Obtain nutrition services referral as needed  Outcome: Progressing     Problem: GENITOURINARY - ADULT  Goal: Maintains or returns to baseline urinary function  Description  INTERVENTIONS:  - Assess urinary function  - Encourage oral fluids to ensure adequate hydration  - Administer IV fluids as ordered to ensure adequate hydration  - Administer ordered medications as needed  - Offer frequent toileting  - Follow urinary retention protocol if ordered  Outcome: Progressing  Goal: Absence of urinary retention  Description  INTERVENTIONS:  - Assess patients ability to void and empty bladder  - Monitor I/O  - Bladder scan as needed  - Discuss with physician/AP medications to alleviate retention as needed  - Discuss catheterization for long term situations as appropriate  Outcome: Progressing     Problem: METABOLIC, FLUID AND ELECTROLYTES - ADULT  Goal: Electrolytes maintained within normal limits  Description  INTERVENTIONS:  - Monitor labs and assess patient for signs and symptoms of electrolyte imbalances  - Administer electrolyte replacement as ordered  - Monitor response to electrolyte replacements, including repeat lab results as appropriate  - Instruct patient on fluid and nutrition as appropriate  Outcome: Progressing  Goal: Fluid balance maintained  Description  INTERVENTIONS:  - Monitor labs and assess for signs and symptoms of volume excess or deficit  - Monitor I/O and WT  - Instruct patient on fluid and nutrition as appropriate  Outcome: Progressing  Goal: Glucose maintained within target range  Description  INTERVENTIONS:  - Monitor Blood Glucose as ordered  - Assess for signs and symptoms of hyperglycemia and hypoglycemia  - Administer ordered medications to maintain glucose within target range  - Assess nutritional intake and initiate nutrition service referral as needed  Outcome: Progressing     Problem: SKIN/TISSUE INTEGRITY - ADULT  Goal: Skin integrity remains intact  Description  INTERVENTIONS  - Identify patients at risk for skin breakdown  - Assess and monitor skin integrity  - Assess and monitor nutrition and hydration status  - Monitor labs (i e  albumin)  - Assess for incontinence   - Turn and reposition patient  - Assist with mobility/ambulation  - Relieve pressure over bony prominences  - Avoid friction and shearing  - Provide appropriate hygiene as needed including keeping skin clean and dry  - Evaluate need for skin moisturizer/barrier cream  - Collaborate with interdisciplinary team (i e  Nutrition, Rehabilitation, etc )   - Patient/family teaching  Outcome: Progressing  Goal: Incision(s), wounds(s) or drain site(s) healing without S/S of infection  Description  INTERVENTIONS  - Assess and document risk factors for skin impairment   - Assess and document dressing, incision, wound bed, drain sites and surrounding tissue  - Initiate Nutrition services consult and/or wound management as needed  Outcome: Progressing  Goal: Oral mucous membranes remain intact  Description  INTERVENTIONS  - Assess oral mucosa and hygiene practices  - Implement preventative oral hygiene regimen  - Implement oral medicated treatments as ordered  - Initiate Nutrition services referral as needed  Outcome: Progressing     Problem: HEMATOLOGIC - ADULT  Goal: Maintains hematologic stability  Description  INTERVENTIONS  - Assess for signs and symptoms of bleeding or hemorrhage  - Monitor labs  - Administer supportive blood products/factors as ordered and appropriate  Outcome: Progressing     Problem: MUSCULOSKELETAL - ADULT  Goal: Maintain or return mobility to safest level of function  Description  INTERVENTIONS:  - Assess patient's ability to carry out ADLs; assess patient's baseline for ADL function and identify physical deficits which impact ability to perform ADLs (bathing, care of mouth/teeth, toileting, grooming, dressing, etc )  - Assess/evaluate cause of self-care deficits   - Assess range of motion  - Assess patient's mobility; develop plan if impaired  - Assess patient's need for assistive devices and provide as appropriate  - Encourage maximum independence but intervene and supervise when necessary  - Involve family in performance of ADLs  - Assess for home care needs following discharge   - Request OT consult to assist with ADL evaluation and planning for discharge  - Provide patient education as appropriate  Outcome: Progressing  Goal: Maintain proper alignment of affected body part  Description  INTERVENTIONS:  - Support, maintain and protect limb and body alignment  - Provide pt/fam with appropriate education  Outcome: Progressing     Problem: Nutrition/Hydration-ADULT  Goal: Nutrient/Hydration intake appropriate for improving, restoring or maintaining nutritional needs  Description  Monitor and assess patient's nutrition/hydration status for malnutrition (ex- brittle hair, bruises, dry skin, pale skin and conjunctiva, muscle wasting, smooth red tongue, and disorientation)  Collaborate with interdisciplinary team and initiate plan and interventions as ordered  Monitor patient's weight and dietary intake as ordered or per policy  Utilize nutrition screening tool and intervene per policy  Determine patient's food preferences and provide high-protein, high-caloric foods as appropriate       INTERVENTIONS:  - Monitor oral intake, urinary output, labs, and treatment plans  - Assess nutrition and hydration status and recommend course of action  - Evaluate amount of meals eaten  - Assist patient with eating if necessary   - Allow adequate time for meals  - Recommend/ encourage appropriate diets, oral nutritional supplements, and vitamin/mineral supplements  - Order, calculate, and assess calorie counts as needed  - Recommend, monitor, and adjust tube feedings and TPN/PPN based on assessed needs  - Assess need for intravenous fluids  - Provide specific nutrition/hydration education as appropriate  - Include patient/family/caregiver in decisions related to nutrition  Outcome: Progressing

## 2019-08-11 NOTE — SOCIAL WORK
Physical therapy recommending use of RW  Script written  Met with pt & mother @ bedside  Pt declines to order RW @ this time as I could not guarantee there would not be any out of pocket cost   I have given pt the script should he change his mind  He does have a SPC  Lexus Betancourt, P6 RN aware

## 2019-08-12 NOTE — UTILIZATION REVIEW
Notification of Discharge  This is a Notification of Discharge from our facility [address]  Please be advised that this patient has been discharge from our facility  Below you will find the admission and discharge date and time including the patients disposition  PRESENTATION DATE: 8/1/2019  1:56 PM  OBS ADMISSION DATE:   IP ADMISSION DATE: 8/1/19 1444   DISCHARGE DATE: 8/11/2019  2:50 PM  DISPOSITION: Home/Self Care Home/Self Care   Admission Orders listed below:  Admission Orders (From admission, onward)     Ordered        08/01/19 1444  Inpatient Admission  Once                   Please contact the UR Department if additional information is required to close this patient's authorization/case  [address]  Utilization Review Department  Phone: [phone]; Fax [phone]  [email or web address]  ATTENTION: Please call with any questions or concerns to [phone]  and carefully listen to the prompts so that you are directed to the right person  Send all requests for admission clinical reviews, approved or denied determinations and any other requests to fax [phone]   All voicemails are confidential

## 2019-08-29 ENCOUNTER — OFFICE VISIT (OUTPATIENT)
Dept: SURGERY | Facility: CLINIC | Age: 29
End: 2019-08-29
Payer: COMMERCIAL

## 2019-08-29 VITALS
BODY MASS INDEX: 18.85 KG/M2 | WEIGHT: 139.2 LBS | TEMPERATURE: 98.5 F | HEIGHT: 72 IN | SYSTOLIC BLOOD PRESSURE: 116 MMHG | DIASTOLIC BLOOD PRESSURE: 74 MMHG | HEART RATE: 80 BPM

## 2019-08-29 DIAGNOSIS — S36.039D LACERATION OF SPLEEN, SUBSEQUENT ENCOUNTER: ICD-10-CM

## 2019-08-29 DIAGNOSIS — S27.329A PULMONARY CONTUSION: ICD-10-CM

## 2019-08-29 DIAGNOSIS — S71.112D LACERATION OF LEFT THIGH, SUBSEQUENT ENCOUNTER: ICD-10-CM

## 2019-08-29 DIAGNOSIS — S22.42XD CLOSED FRACTURE OF MULTIPLE RIBS OF LEFT SIDE WITH ROUTINE HEALING, SUBSEQUENT ENCOUNTER: Primary | ICD-10-CM

## 2019-08-29 PROCEDURE — 99214 OFFICE O/P EST MOD 30 MIN: CPT | Performed by: SURGERY

## 2019-08-29 NOTE — ASSESSMENT & PLAN NOTE
-status post IR embolization  -grade 5 splenic laceration  -discussion with team; could not locate any record of administration of a vaccinations  -appears through chart review that the patient still has the inferior pole of his spleen intact  -will not give splenic vaccinations at this juncture  -patient is still in solid organ injury precautions and that he will have a approximate 12 week recovery  -informed the patient that this again will be a slow recovery at this point for him to return to an active baseline
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
## 2019-08-30 NOTE — ASSESSMENT & PLAN NOTE
- continue rib fracture education  - incentive spirometer  - informed the patient that this could take a total of 8 weeks to recover  - inform the patient that this will be a slow recovery course at this time

## 2019-08-30 NOTE — ASSESSMENT & PLAN NOTE
- sutures removed at today's visit  - wound appeared to be clean, dry, intact  - no further recommendations  - sutures replaced with Steri-Strips and benzoin

## 2021-04-28 NOTE — ASSESSMENT & PLAN NOTE
- Left-sided minimally displaced medial femoral condyle fracture  - Appreciate orthopedic surgery evaluation and recommendations  Non operative management recommended  - Patient allowed to be weight-bearing as tolerated on the left lower extremity per Orthopedic surgery  - Continue multimodal analgesic regimen as needed  - Continue PT and OT evaluation and treatment as indicated  - Maintain hinged knee brace per Orthopedic surgery   - Outpatient follow-up with Orthopedic surgery for re-evaluation  .

## (undated) DEVICE — TONGUE DEPRESSOR STERILE

## (undated) DEVICE — BULB SYRINGE,IRRIGATION WITH PROTECTIVE CAP: Brand: DOVER

## (undated) DEVICE — INTENDED FOR TISSUE SEPARATION, AND OTHER PROCEDURES THAT REQUIRE A SHARP SURGICAL BLADE TO PUNCTURE OR CUT.: Brand: BARD-PARKER SAFETY BLADES SIZE 10, STERILE

## (undated) DEVICE — SILVER-COATED ANTIMICROBIAL BARRIER DRESSING: Brand: ACTICOAT   4" X 8"

## (undated) DEVICE — GAUZE SPONGES,16 PLY: Brand: CURITY

## (undated) DEVICE — GLOVE SRG BIOGEL 7.5

## (undated) DEVICE — PAD GROUNDING ADULT

## (undated) DEVICE — PADDING CAST 4 IN  COTTON STRL

## (undated) DEVICE — 3M™ IOBAN™ 2 ANTIMICROBIAL INCISE DRAPE 6650EZ: Brand: IOBAN™ 2

## (undated) DEVICE — SUT VICRYL 1 CTB-1 36 IN JB947

## (undated) DEVICE — DERMATOME BLADES: Brand: DERMATOME

## (undated) DEVICE — DRESSING XEROFORM 5 X 9

## (undated) DEVICE — BETHLEHEM UNIVERSAL OUTPATIENT: Brand: CARDINAL HEALTH

## (undated) DEVICE — SPONGE STICK WITH PVP-I: Brand: KENDALL

## (undated) DEVICE — 3M™ TEGADERM™ TRANSPARENT FILM DRESSING FRAME STYLE, 1628, 6 IN X 8 IN (15 CM X 20 CM), 10/CT 8CT/CASE: Brand: 3M™ TEGADERM™

## (undated) DEVICE — 3M™ TEGADERM™ TRANSPARENT FILM DRESSING FRAME STYLE, 1627, 4 IN X 10 IN (10 CM X 25 CM), 20/CT 4CT/CASE: Brand: 3M™ TEGADERM™

## (undated) DEVICE — GLOVE INDICATOR PI UNDERGLOVE SZ 7.5 BLUE

## (undated) DEVICE — THE SIMPULSE SOLO SYSTEM WITH ULTREX RETRACTABLE SPLASH SHIELD TIP: Brand: SIMPULSE SOLO

## (undated) DEVICE — GLOVE PI ULTRA TOUCH SZ.6.5

## (undated) DEVICE — ABDOMINAL PAD: Brand: DERMACEA

## (undated) DEVICE — 3000CC GUARDIAN II: Brand: GUARDIAN

## (undated) DEVICE — GLOVE INDICATOR PI UNDERGLOVE SZ 8.5 BLUE

## (undated) DEVICE — GLOVE SRG BIOGEL 8

## (undated) DEVICE — TIBURON SPLIT SHEET: Brand: CONVERTORS

## (undated) DEVICE — SPONGE LAP 18 X 18 IN

## (undated) DEVICE — BETHLEHEM UNIVERSAL MINOR GEN: Brand: CARDINAL HEALTH

## (undated) DEVICE — PACK MAJOR ORTHO W/SPLITS PBDS

## (undated) DEVICE — CHLORAPREP HI-LITE 26ML ORANGE

## (undated) DEVICE — SUT VICRYL 2-0 CTB-1 36 IN JB945

## (undated) DEVICE — NEEDLE 25G X 1 1/2

## (undated) DEVICE — 3M™ TEGADERM™ TRANSPARENT FILM DRESSING FRAME STYLE, 1626W, 4 IN X 4-3/4 IN (10 CM X 12 CM), 50/CT 4CT/CASE: Brand: 3M™ TEGADERM™

## (undated) DEVICE — GLOVE INDICATOR PI UNDERGLOVE SZ 7 BLUE

## (undated) DEVICE — INTENDED FOR TISSUE SEPARATION, AND OTHER PROCEDURES THAT REQUIRE A SHARP SURGICAL BLADE TO PUNCTURE OR CUT.: Brand: BARD-PARKER SAFETY BLADES SIZE 15, STERILE

## (undated) DEVICE — UNIVERSAL MAJOR EXTREMITY,KIT: Brand: CARDINAL HEALTH

## (undated) DEVICE — PENCIL ELECTROSURG E-Z CLEAN -0035H

## (undated) DEVICE — ACE WRAP 6 IN UNSTERILE

## (undated) DEVICE — PLUMEPEN PRO 10FT